# Patient Record
Sex: MALE | Race: WHITE | Employment: FULL TIME | URBAN - NONMETROPOLITAN AREA
[De-identification: names, ages, dates, MRNs, and addresses within clinical notes are randomized per-mention and may not be internally consistent; named-entity substitution may affect disease eponyms.]

---

## 2019-12-26 ENCOUNTER — OFFICE VISIT (OUTPATIENT)
Dept: PRIMARY CARE CLINIC | Age: 59
End: 2019-12-26
Payer: COMMERCIAL

## 2019-12-26 ENCOUNTER — HOSPITAL ENCOUNTER (OUTPATIENT)
Dept: GENERAL RADIOLOGY | Age: 59
Discharge: HOME | End: 2019-12-28
Payer: COMMERCIAL

## 2019-12-26 ENCOUNTER — TELEPHONE (OUTPATIENT)
Dept: FAMILY MEDICINE CLINIC | Age: 59
End: 2019-12-26

## 2019-12-26 VITALS
HEART RATE: 96 BPM | OXYGEN SATURATION: 98 % | BODY MASS INDEX: 37.97 KG/M2 | WEIGHT: 280 LBS | DIASTOLIC BLOOD PRESSURE: 94 MMHG | RESPIRATION RATE: 20 BRPM | SYSTOLIC BLOOD PRESSURE: 158 MMHG

## 2019-12-26 DIAGNOSIS — M54.2 NECK PAIN ON LEFT SIDE: ICD-10-CM

## 2019-12-26 PROCEDURE — 99214 OFFICE O/P EST MOD 30 MIN: CPT | Performed by: PHYSICIAN ASSISTANT

## 2019-12-26 PROCEDURE — 72050 X-RAY EXAM NECK SPINE 4/5VWS: CPT

## 2019-12-26 RX ORDER — CYCLOBENZAPRINE HCL 10 MG
10 TABLET ORAL 3 TIMES DAILY PRN
Qty: 21 TABLET | Refills: 0 | Status: SHIPPED | OUTPATIENT
Start: 2019-12-26 | End: 2020-01-05

## 2019-12-26 RX ORDER — LORATADINE 10 MG/1
10 TABLET ORAL DAILY PRN
COMMUNITY

## 2019-12-26 RX ORDER — PREDNISONE 20 MG/1
TABLET ORAL
Qty: 15 TABLET | Refills: 0 | Status: SHIPPED | OUTPATIENT
Start: 2019-12-26 | End: 2020-02-05 | Stop reason: ALTCHOICE

## 2019-12-26 ASSESSMENT — ENCOUNTER SYMPTOMS
VOMITING: 0
COUGH: 0
BACK PAIN: 1
NAUSEA: 0
DIARRHEA: 0
SHORTNESS OF BREATH: 0
RESPIRATORY NEGATIVE: 1

## 2019-12-26 ASSESSMENT — PATIENT HEALTH QUESTIONNAIRE - PHQ9
SUM OF ALL RESPONSES TO PHQ QUESTIONS 1-9: 0
1. LITTLE INTEREST OR PLEASURE IN DOING THINGS: 0
SUM OF ALL RESPONSES TO PHQ QUESTIONS 1-9: 0
2. FEELING DOWN, DEPRESSED OR HOPELESS: 0
SUM OF ALL RESPONSES TO PHQ9 QUESTIONS 1 & 2: 0

## 2019-12-26 NOTE — PROGRESS NOTES
fatigue and fever. HENT:        Drainage from nose is yellow. Respiratory: Negative. Negative for cough and shortness of breath. Cardiovascular: Negative for chest pain and palpitations. Gastrointestinal: Negative for diarrhea, nausea and vomiting. Musculoskeletal: Positive for back pain, neck pain and neck stiffness. Negative for myalgias. Taking aleve, motrin tylenol norco nothing helped. Ice did help him sleep.  cbd oil did not help. Skin: Negative for rash. Has had shingles in past not in current affected areas. Neurological: Positive for numbness. Negative for dizziness, weakness, light-headedness and headaches. Has burning in the upper back and around the left ear. No numbness tingling into the left arm. Psychiatric/Behavioral: Positive for sleep disturbance. The patient is not nervous/anxious. The neck back shoulder keeps him awake. He slept on the left side and was ok can not sleep on the right side. Sleeping in recliner lately. His pain was severe last night. Objective:      BP (!) 158/94   Pulse 96   Resp 20   Wt 280 lb (127 kg)   SpO2 98%   BMI 37.97 kg/m²     Physical Exam  Vitals signs and nursing note reviewed. Constitutional:       Appearance: Normal appearance. HENT:      Head: Normocephalic and atraumatic. Right Ear: External ear normal.      Left Ear: External ear normal.      Nose: Nose normal. No rhinorrhea. Eyes:      General: No scleral icterus. Neck:      Musculoskeletal: Normal range of motion. Neck rigidity and muscular tenderness present. Comments: Pain with flexion of the cervical spine. Pain with rotation as well. Has very tight trapezii bilaterally. No pain with palpation over the cervical spine except at C7. No redness or swelling noted. Cardiovascular:      Rate and Rhythm: Normal rate and regular rhythm. Heart sounds: Normal heart sounds. No murmur. No gallop.     Pulmonary:      Effort: Pulmonary effort is normal. No respiratory distress. Breath sounds: Normal breath sounds. No stridor. No wheezing, rhonchi or rales. Chest:      Chest wall: No tenderness. Musculoskeletal: Normal range of motion. General: Tenderness present. Lymphadenopathy:      Cervical: No cervical adenopathy. Skin:     General: Skin is warm and dry. Findings: No erythema or rash. Neurological:      General: No focal deficit present. Mental Status: He is alert and oriented to person, place, and time. Sensory: No sensory deficit. Motor: No weakness. Coordination: Coordination normal.      Deep Tendon Reflexes: Reflexes normal.      Comments: Normal strength and sensation upper extremities bilaterally. Normal range of motion of the upper extremities. Psychiatric:         Mood and Affect: Mood normal.         Behavior: Behavior normal.       Xr Cervical Spine (4-5 Views)    Result Date: 12/26/2019  EXAMINATION: 6 XRAY VIEWS OF THE CERVICAL SPINE 12/26/2019 10:36 am COMPARISON: None. HISTORY: ORDERING SYSTEM PROVIDED HISTORY: Neck pain on left side TECHNOLOGIST PROVIDED HISTORY: neck pain especially at C 7 doing some heavy lifting lately no trauma otherwise  history of herniated cervical disc years ago no surgery  numbness burning in the left trap and around the left ear Reason for Exam: Neck pain especially at C7 doing some heavy lifting lately. No trauma. History of herniated cervical disc year ago, no surgery. Numbness and burning in left trap. And around the left ear. Attempted to have patient use sandbags for views, pain in left shoulder. Acuity: Chronic Type of Exam: Initial FINDINGS: Straightening of the normal cervical lordosis which may relate to muscle spasm. No acute fracture or subluxation is noted. Vertebral body heights appear well maintained. There appears to be moderate spondylosis at the level of C4-5 with mild to moderate bilateral neural foraminal stenosis.

## 2020-02-05 ENCOUNTER — OFFICE VISIT (OUTPATIENT)
Dept: FAMILY MEDICINE CLINIC | Age: 60
End: 2020-02-05
Payer: COMMERCIAL

## 2020-02-05 ENCOUNTER — TELEPHONE (OUTPATIENT)
Dept: SURGERY | Age: 60
End: 2020-02-05

## 2020-02-05 VITALS
WEIGHT: 277 LBS | HEART RATE: 74 BPM | SYSTOLIC BLOOD PRESSURE: 136 MMHG | HEIGHT: 72 IN | DIASTOLIC BLOOD PRESSURE: 74 MMHG | OXYGEN SATURATION: 97 % | BODY MASS INDEX: 37.52 KG/M2 | RESPIRATION RATE: 18 BRPM

## 2020-02-05 PROCEDURE — 99396 PREV VISIT EST AGE 40-64: CPT | Performed by: PHYSICIAN ASSISTANT

## 2020-02-05 ASSESSMENT — PATIENT HEALTH QUESTIONNAIRE - PHQ9
SUM OF ALL RESPONSES TO PHQ QUESTIONS 1-9: 0
1. LITTLE INTEREST OR PLEASURE IN DOING THINGS: 0
SUM OF ALL RESPONSES TO PHQ9 QUESTIONS 1 & 2: 0
2. FEELING DOWN, DEPRESSED OR HOPELESS: 0
SUM OF ALL RESPONSES TO PHQ QUESTIONS 1-9: 0

## 2020-02-05 ASSESSMENT — ENCOUNTER SYMPTOMS
RESPIRATORY NEGATIVE: 1
NAUSEA: 0
DIARRHEA: 0
VOMITING: 0
COUGH: 0
SHORTNESS OF BREATH: 0

## 2020-02-05 NOTE — PROGRESS NOTES
[facility]    Subjective:      Patient ID: Gracie Kumar is a 61 y.o. y.o. male. Patient is seen today to establish no recent illness and does not want a flu shot. I saw him in urgent care for his left shoulder and is much better and can even sleep on this side with no problems. 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 Has worked there 43 years. Past Medical History:   Diagnosis Date    Anemia     Avascular necrosis ([address]     bilateral hips. S/P FAVIAN b/L    Kidney stone     Mixed hyperlipidemia     Obesity     Osteoarthritis     hips, knees       Past Surgical History:   Procedure Laterality Date    OTHER SURGICAL HISTORY Left 08/10/2016    cortisone injection-knee. [facility]    REVISION TOTAL HIP ARTHROPLASTY Left 11/2013    Dr. Oli Duran Right 12/16/2009    TOTAL HIP ARTHROPLASTY Left 10/28/2009       Family History   Problem Relation Age of Onset    Stroke Father 76    Cancer Father         skin    Other Brother         sleep apnea    COPD Brother         tobacco abuse       Allergies   Allergen Reactions    Pcn [Penicillins] Other (See Comments)     Unknown rx       Current Outpatient Medications   Medication Sig Dispense Refill    loratadine (CLARITIN) 10 MG tablet Take 10 mg by mouth daily as needed      naproxen sodium (ALEVE) 220 MG tablet Take 220 mg by mouth 2 times daily as needed for Pain.  Multiple Vitamins-Minerals (THERAPEUTIC MULTIVITAMIN-MINERALS) tablet Take 1 tablet by mouth daily.  aspirin 81 MG tablet Take 81 mg by mouth daily.  ferrous sulfate 325 (65 FE) MG tablet Take 325 mg by mouth every other day. No current facility-administered medications for this visit. Review of Systems   Constitutional: Negative for appetite change, chills, fatigue and fever. Works 3 rd shift so energy is appropriate given this.   It really depends on sleep quality. HENT: Negative. Eyes:        Wears glasses KIKE darien Giles sees Gonzalo Pickard. Respiratory: Negative. Negative for cough and shortness of breath. Cardiovascular: Negative for chest pain, palpitations and leg swelling. Gastrointestinal: Negative for diarrhea, nausea and vomiting. Has not had colonoscopy. Musculoskeletal: Negative for myalgias. Skin: Negative for rash. Neurological: Negative for light-headedness, numbness and headaches. Psychiatric/Behavioral: Negative for sleep disturbance. The patient is not nervous/anxious. Objective:      /74   Pulse 74   Resp 18   Ht 6' (1.829 m)   Wt 277 lb (125.6 kg)   SpO2 97%   BMI 37.57 kg/m²     Physical Exam  Vitals signs and nursing note reviewed. Constitutional:       General: He is not in acute distress. Appearance: Normal appearance. He is well-developed. He is obese. HENT:      Head: Normocephalic and atraumatic. Right Ear: Tympanic membrane and ear canal normal.      Left Ear: Tympanic membrane and ear canal normal.      Nose: Nose normal. No congestion or rhinorrhea. Mouth/Throat:      Mouth: Mucous membranes are moist.      Pharynx: No oropharyngeal exudate or posterior oropharyngeal erythema. Eyes:      General: No scleral icterus. Conjunctiva/sclera: Conjunctivae normal.   Neck:      Musculoskeletal: Normal range of motion and neck supple. No neck rigidity or muscular tenderness. Thyroid: No thyroid mass, thyromegaly or thyroid tenderness. Vascular: No carotid bruit. Cardiovascular:      Rate and Rhythm: Normal rate and regular rhythm. Heart sounds: Normal heart sounds. No murmur. No gallop. Pulmonary:      Effort: Pulmonary effort is normal. No respiratory distress. Breath sounds: Normal breath sounds. No stridor. No wheezing, rhonchi or rales. Abdominal:      General: Bowel sounds are normal. There is no distension. Palpations: Abdomen is soft. There is no mass. Tenderness: There is no abdominal tenderness. There is no guarding or rebound. Hernia: No hernia is present. Musculoskeletal:         General: No swelling. Lymphadenopathy:      Cervical: No cervical adenopathy. Skin:     General: Skin is warm and dry. Findings: No rash. Neurological:      General: No focal deficit present. Mental Status: He is alert and oriented to person, place, and time. Psychiatric:         Mood and Affect: Mood normal.         Behavior: Behavior normal.         Thought Content: Thought content normal.         Judgment: Judgment normal.           Assessment & Plan:     1. Need for vaccination    - INFLUENZA, QUADV, 3 YRS AND OLDER, IM PF, PREFILL SYR OR SDV, 0.5ML (AFLURIA QUADV, PF)  - Ambulatory referral to General Surgery  - zoster recombinant adjuvanted vaccine [facility]) 50 MCG/0.5ML SUSR injection; Inject 0.5 mLs into the muscle See Admin Instructions 1 dose now and repeat in 2-6 months  Dispense: 0.5 mL; Refill: 0    2. Screening for colon cancer    - Ambulatory referral to General Surgery    3. Impaired glucose tolerance test    - Hemoglobin A1C; Future    4. Mixed hyperlipidemia    - Lipid Panel;  Future    He will be notified of all results  Further treatment based on results  Follow up six months sooner if problems  Lifestyle changes  Flu shot today  Monitor shoulder if changes or worsen let me know  HEP  Answered his questions          KEVIN Nunez  2/5/2020 1:19 PM    (Pleasenote that portions of this note were completed with a voice recognition program.Efforts were made to edit the dictations but occasionally words are mis-transcribed.)

## 2020-02-05 NOTE — TELEPHONE ENCOUNTER
Sent out Dr. Lake Gave paperwork for colonoscopy at this time. No s/sx at this time. Ref by [facility].

## 2021-01-14 ENCOUNTER — TELEPHONE (OUTPATIENT)
Dept: FAMILY MEDICINE CLINIC | Age: 61
End: 2021-01-14

## 2021-01-17 ENCOUNTER — OFFICE VISIT (OUTPATIENT)
Dept: PRIMARY CARE CLINIC | Age: 61
End: 2021-01-17
Payer: COMMERCIAL

## 2021-01-17 ENCOUNTER — HOSPITAL ENCOUNTER (OUTPATIENT)
Age: 61
Setting detail: SPECIMEN
Discharge: HOME | End: 2021-01-17
Payer: COMMERCIAL

## 2021-01-17 VITALS
HEART RATE: 71 BPM | HEIGHT: 72 IN | BODY MASS INDEX: 37.76 KG/M2 | DIASTOLIC BLOOD PRESSURE: 100 MMHG | OXYGEN SATURATION: 98 % | WEIGHT: 278.8 LBS | SYSTOLIC BLOOD PRESSURE: 140 MMHG | RESPIRATION RATE: 18 BRPM | TEMPERATURE: 99.5 F

## 2021-01-17 DIAGNOSIS — R09.81 CONGESTION OF NASAL SINUS: ICD-10-CM

## 2021-01-17 DIAGNOSIS — R53.83 FATIGUE, UNSPECIFIED TYPE: ICD-10-CM

## 2021-01-17 DIAGNOSIS — R05.9 COUGH: ICD-10-CM

## 2021-01-17 DIAGNOSIS — Z20.822 PERSON UNDER INVESTIGATION FOR COVID-19: ICD-10-CM

## 2021-01-17 DIAGNOSIS — R05.9 COUGH: Primary | ICD-10-CM

## 2021-01-17 PROCEDURE — U0003 INFECTIOUS AGENT DETECTION BY NUCLEIC ACID (DNA OR RNA); SEVERE ACUTE RESPIRATORY SYNDROME CORONAVIRUS 2 (SARS-COV-2) (CORONAVIRUS DISEASE [COVID-19]), AMPLIFIED PROBE TECHNIQUE, MAKING USE OF HIGH THROUGHPUT TECHNOLOGIES AS DESCRIBED BY CMS-2020-01-R: HCPCS

## 2021-01-17 PROCEDURE — 99213 OFFICE O/P EST LOW 20 MIN: CPT | Performed by: NURSE PRACTITIONER

## 2021-01-17 ASSESSMENT — PATIENT HEALTH QUESTIONNAIRE - PHQ9
SUM OF ALL RESPONSES TO PHQ QUESTIONS 1-9: 0
SUM OF ALL RESPONSES TO PHQ QUESTIONS 1-9: 0
SUM OF ALL RESPONSES TO PHQ9 QUESTIONS 1 & 2: 0

## 2021-01-17 ASSESSMENT — ENCOUNTER SYMPTOMS
SORE THROAT: 1
WHEEZING: 0
COUGH: 1
NAUSEA: 0
VOMITING: 0
SHORTNESS OF BREATH: 0
DIARRHEA: 1

## 2021-01-17 NOTE — PROGRESS NOTES
[facility]  [facility] [place]  [place] [address]  Dept: [phone]  Dept Fax: [phone]  Loc: [phone]        CHIEF COMPLAINT       Chief Complaint   Patient presents with    Cough     stuffy nose,diarrhea,fatigue,sore throat ,exposed to positive ,started thursday       Nurses Notes reviewed and I agree except as noted in the HPI. HISTORY OF PRESENT ILLNESS   Alber Thomas is a 61 y.o. male who presents to [facility] today (1/17/2021) for evaluation of:   Cough  This is a new problem. The current episode started in the past 7 days (Thursday). The cough is non-productive. Associated symptoms include a fever (99.5 at home), nasal congestion and a sore throat. Pertinent negatives include no chest pain, shortness of breath or wheezing. He has tried OTC cough suppressant (nasal spray) for the symptoms. The treatment provided mild relief. Wife is covid positive currently. REVIEW OF SYSTEMS     Review of Systems   Constitutional: Positive for fatigue and fever (99.5 at home). HENT: Positive for congestion and sore throat. Respiratory: Positive for cough. Negative for shortness of breath and wheezing. Cardiovascular: Negative for chest pain. Gastrointestinal: Positive for diarrhea (loose stools). Negative for nausea and vomiting. PAST MEDICAL HISTORY         Diagnosis Date    Anemia     Avascular necrosis ([address]     bilateral hips. S/P FAVIAN b/L    Kidney stone     Mixed hyperlipidemia     Obesity     Osteoarthritis     hips, knees       SURGICAL HISTORY     Patient  has a past surgical history that includes Total hip arthroplasty (Right, 12/16/2009); Total hip arthroplasty (Left, 10/28/2009); Revision total hip arthroplasty (Left, 11/2013); other surgical history (Left, 08/10/2016); and [place] tooth extraction.     CURRENT MEDICATIONS       Outpatient Medications Prior to Visit Medication Sig Dispense Refill    naproxen sodium (ALEVE) 220 MG tablet Take 220 mg by mouth 2 times daily as needed for Pain.  Multiple Vitamins-Minerals (THERAPEUTIC MULTIVITAMIN-MINERALS) tablet Take 1 tablet by mouth daily.  aspirin 81 MG tablet Take 81 mg by mouth daily.  ferrous sulfate 325 (65 FE) MG tablet Take 325 mg by mouth every other day.  loratadine (CLARITIN) 10 MG tablet Take 10 mg by mouth daily as needed       No facility-administered medications prior to visit. ALLERGIES     Patient is is allergic to pcn [penicillins]. FAMILY HISTORY     Patient's family history includes COPD in his brother; Cancer in his father; Other in his brother; Stroke (age of onset: 76) in his father. SOCIAL HISTORY     Patient  reports that he has never smoked. He has never used smokeless tobacco. He reports current alcohol use of about 5.0 standard drinks of alcohol per week. He reports that he does not use drugs. PHYSICAL EXAM     VITALS  BP: (!) 140/100, Temp: 99.5 °F (37.5 °C), Pulse: 71, Resp: 18, SpO2: 98 %  Physical Exam  Vitals signs reviewed. Constitutional:       General: He is not in acute distress. Appearance: He is not ill-appearing. HENT:      Nose: Congestion present. No rhinorrhea. Eyes:      Pupils: Pupils are equal, round, and reactive to light. Neck:      Musculoskeletal: Normal range of motion and neck supple. No neck rigidity. Cardiovascular:      Rate and Rhythm: Normal rate and regular rhythm. Heart sounds: Normal heart sounds, S1 normal and S2 normal. No murmur. Pulmonary:      Effort: Pulmonary effort is normal. No accessory muscle usage or respiratory distress. Breath sounds: Normal breath sounds. No wheezing or rhonchi. Musculoskeletal: Normal range of motion. Lymphadenopathy:      Cervical: No cervical adenopathy. Skin:     General: Skin is warm and dry. Capillary Refill: Capillary refill takes less than 2 seconds. Neurological:      General: No focal deficit present. Mental Status: He is alert. DIAGNOSTIC RESULTS   Labs:No results found for this visit on 01/17/21. IMAGING:        CLINICAL COURSE:     Vitals:    01/17/21 1532   BP: (!) 140/100   Pulse: 71   Resp: 18   Temp: 99.5 °F (37.5 °C)   TempSrc: Temporal   SpO2: 98%   Weight: 278 lb 12.8 oz (126.5 kg)   Height: 6' (1.829 m)           PROCEDURES:  None  FINAL IMPRESSION      1. Cough    2. Congestion of nasal sinus    3. Fatigue, unspecified type    4. Person under investigation for COVID-19         DISPOSITION/PLAN     Patient Instructions     Will notify you of covid test result as soon as available. You should isolate at home in an area away from family. If you must be around family members, please wear a mask. Quarantine at home until result is available. This means do not go to work/school, attend family gatherings, or invite others to your home until you know your test results. A work/school note will be provided for you. Symptoms appear viral; no antibiotic warranted at this time. May try mucinex (guaifenesin) to help with congestion and robitussin for persistent cough. May use tylenol or ibuprofen for fever/body aches/headache. Increase water intake to help thin secretions. Use cool mist humidifier at bedtime. Use nasal saline flush as needed. Practice good hand hygiene and cover your cough and sneeze to prevent passing virus on. If symptoms worsen or fail to improve, please return to clinic. If you develop severe worsening of symptoms such as chest pain or difficulty breathing, please go to ER. Patient Education        Learning About Coronavirus [phone])  What is coronavirus (COVID-19)? COVID-19 is a disease caused by a new type of coronavirus. This illness was first found in December 2019. It has since spread worldwide. Coronaviruses are a large group of viruses. They cause the common cold. They also cause more serious illnesses like Middle East respiratory syndrome (MERS) and severe acute respiratory syndrome (SARS). COVID-19 is caused by a novel coronavirus. That means it's a new type that has not been seen in people before. What are the symptoms? Coronavirus (COVID-19) symptoms may include:  · Fever. · Cough. · Trouble breathing. · Chills or repeated shaking with chills. · Muscle pain. · Headache. · Sore throat. · New loss of taste or smell. · Vomiting. · Diarrhea. In severe cases, COVID-19 can cause pneumonia and make it hard to breathe without help from a machine. It can cause death. How is it diagnosed? COVID-19 is diagnosed with a viral test. This may also be called a PCR test or antigen test. It looks for evidence of the virus in your breathing passages or lungs (respiratory system). The test is most often done on a sample from the nose, throat, or lungs. It's sometimes done on a sample of saliva. One way a sample is collected is by putting a long swab into the back of your nose. How is it treated? Mild cases of COVID-19 can be treated at home. Serious cases need treatment in the hospital. Treatment may include medicines to reduce symptoms, plus breathing support such as oxygen therapy or a ventilator. Some people may be placed on their belly to help their oxygen levels. Treatments that may help people who have COVID-19 include:  Antiviral medicines. These medicines treat viral infections. Remdesivir is an example. Immune-based therapy. These medicines help the immune system fight COVID-19. One example is bamlanivimab. It's a monoclonal antibody. Blood thinners. These medicines help prevent blood clots. People with severe illness are at risk for blood clots. How can you protect yourself and others?   The best way to protect yourself from getting sick is to: Follow-up care is a key part of your treatment and safety. Be sure to make and go to all appointments, and call your doctor if you are having problems. It's also a good idea to know your test results and keep a list of the medicines you take. How can you care for yourself at home? · Get extra rest. It can help you feel better. · Drink plenty of fluids. This helps replace fluids lost from fever. Fluids also help ease a scratchy throat. Water, soup, fruit juice, and hot tea with lemon are good choices. · Take acetaminophen (such as Tylenol) to reduce a fever. It may also help with muscle aches. Read and follow all instructions on the label. · Use petroleum jelly on sore skin. This can help if the skin around your nose and lips becomes sore from rubbing a lot with tissues. Tips for self-isolation  · Limit contact with people in your home. If possible, stay in a separate bedroom and use a separate bathroom. · Wear a cloth face cover when you are around other people. It can help stop the spread of the virus when you cough or sneeze. · If you have to leave home, avoid crowds and try to stay at least 6 feet away from other people. · Avoid contact with pets and other animals. · Cover your mouth and nose with a tissue when you cough or sneeze. Then throw it in the trash right away. · Wash your hands often, especially after you cough or sneeze. Use soap and water, and scrub for at least 20 seconds. If soap and water aren't available, use an alcohol-based hand . · Don't share personal household items. These include bedding, towels, cups and glasses, and eating utensils. · [address] in the warmest water allowed for the fabric type, and dry it completely. It's okay to wash other people's laundry with yours. · Clean and disinfect your home every day. Use household  and disinfectant wipes or sprays. Take special care to clean things that you grab with your hands. These include doorknobs, remote controls, phones, and handles on your refrigerator and microwave. And don't forget countertops, tabletops, bathrooms, and computer keyboards. When you can end self-isolation  · If you know or suspect that you have COVID-19, stay in self-isolation until:  ? You haven't had a fever for 24 hours while not taking medicines to lower the fever, and  ? Your symptoms have improved, and  ? It's been at least 10 days since your symptoms started. · Talk to your doctor about whether you also need testing, especially if you have a weakened immune system. When should you call for help? Call 911 anytime you think you may need emergency care. For example, call if you have life-threatening symptoms, such as:    · You have severe trouble breathing. (You can't talk at all.)     · You have constant chest pain or pressure.     · You are severely dizzy or lightheaded.     · You are confused or can't think clearly.     · Your face and lips have a blue color.     · You pass out (lose consciousness) or are very hard to wake up. Call your doctor now or seek immediate medical care if:    · You have moderate trouble breathing. (You can't speak a full sentence.)     · You are coughing up blood (more than about 1 teaspoon).     · You have signs of low blood pressure. These include feeling lightheaded; being too weak to stand; and having cold, pale, clammy skin. Watch closely for changes in your health, and be sure to contact your doctor if:    · Your symptoms get worse.     · You are not getting better as expected. Call before you go to the doctor's office. Follow their instructions. And wear a cloth face cover. Current as of: December 18, 2020               Content Version: 12.7  © 1667-0701 [employer], [employer]. Care instructions adapted under license by [facility] ([facility]). If you have questions about a medical condition or this instruction, always ask your healthcare professional. [address] any warranty or liability for your use of this information. Orders Placed This Encounter   Procedures    COVID-19 Ambulatory     Standing Status:   Future     Standing Expiration Date:   2/17/2021     Scheduling Instructions:      Saline media preferred given current shortage of viral transport media but both acceptable     Order Specific Question:   Is this test for diagnosis or screening? Answer:   Diagnosis of ill patient     Order Specific Question:   Symptomatic for COVID-19 as defined by CDC? Answer:   Yes     Order Specific Question:   Date of Symptom Onset     Answer:   1/14/2021     Order Specific Question:   Hospitalized for COVID-19? Answer:   No     Order Specific Question:   Admitted to ICU for COVID-19? Answer:   No     Order Specific Question:   Employed in healthcare setting? Answer:   No     Order Specific Question:   Resident in a congregate (group) care setting? Answer:   No     Order Specific Question:   Pregnant: Answer:   No     Order Specific Question:   Previously tested for COVID-19? Answer:   No     Outpatient Encounter Medications as of 1/17/2021   Medication Sig Dispense Refill    naproxen sodium (ALEVE) 220 MG tablet Take 220 mg by mouth 2 times daily as needed for Pain.  Multiple Vitamins-Minerals (THERAPEUTIC MULTIVITAMIN-MINERALS) tablet Take 1 tablet by mouth daily.  aspirin 81 MG tablet Take 81 mg by mouth daily.  ferrous sulfate 325 (65 FE) MG tablet Take 325 mg by mouth every other day.  loratadine (CLARITIN) 10 MG tablet Take 10 mg by mouth daily as needed       No facility-administered encounter medications on file as of 1/17/2021. No follow-ups on file. Electronically signed by CHANDAN Yang NP on 1/17/2021 at 4:07 PM

## 2021-01-17 NOTE — PATIENT INSTRUCTIONS
Will notify you of covid test result as soon as available. You should isolate at home in an area away from family. If you must be around family members, please wear a mask. Quarantine at home until result is available. This means do not go to work/school, attend family gatherings, or invite others to your home until you know your test results. A work/school note will be provided for you. Symptoms appear viral; no antibiotic warranted at this time. May try mucinex (guaifenesin) to help with congestion and robitussin for persistent cough. May use tylenol or ibuprofen for fever/body aches/headache. Increase water intake to help thin secretions. Use cool mist humidifier at bedtime. Use nasal saline flush as needed. Practice good hand hygiene and cover your cough and sneeze to prevent passing virus on. If symptoms worsen or fail to improve, please return to clinic. If you develop severe worsening of symptoms such as chest pain or difficulty breathing, please go to ER. Patient Education        Learning About Coronavirus [phone]  What is coronavirus (COVID-19)? COVID-19 is a disease caused by a new type of coronavirus. This illness was first found in December 2019. It has since spread worldwide. Coronaviruses are a large group of viruses. They cause the common cold. They also cause more serious illnesses like Middle East respiratory syndrome (MERS) and severe acute respiratory syndrome (SARS). COVID-19 is caused by a novel coronavirus. That means it's a new type that has not been seen in people before. What are the symptoms? Coronavirus (COVID-19) symptoms may include:  · Fever. · Cough. · Trouble breathing. · Chills or repeated shaking with chills. · Muscle pain. · Headache. · Sore throat. · New loss of taste or smell. · Vomiting. · Diarrhea. In severe cases, COVID-19 can cause pneumonia and make it hard to breathe without help from a machine. It can cause death. How is it diagnosed? COVID-19 is diagnosed with a viral test. This may also be called a PCR test or antigen test. It looks for evidence of the virus in your breathing passages or lungs (respiratory system). The test is most often done on a sample from the nose, throat, or lungs. It's sometimes done on a sample of saliva. One way a sample is collected is by putting a long swab into the back of your nose. How is it treated? Mild cases of COVID-19 can be treated at home. Serious cases need treatment in the hospital. Treatment may include medicines to reduce symptoms, plus breathing support such as oxygen therapy or a ventilator. Some people may be placed on their belly to help their oxygen levels. Treatments that may help people who have COVID-19 include:  Antiviral medicines. These medicines treat viral infections. Remdesivir is an example. Immune-based therapy. These medicines help the immune system fight COVID-19. One example is bamlanivimab. It's a monoclonal antibody. Blood thinners. These medicines help prevent blood clots. People with severe illness are at risk for blood clots. How can you protect yourself and others? The best way to protect yourself from getting sick is to:  · Avoid areas where there is an outbreak. · Avoid contact with people who may be infected. · Avoid crowds and try to stay at least 6 feet away from other people. · Wash your hands often, especially after you cough or sneeze. Use soap and water, and scrub for at least 20 seconds. If soap and water aren't available, use an alcohol-based hand . · Avoid touching your mouth, nose, and eyes. To help avoid spreading the virus to others:  · Stay home if you are sick or have been exposed to the virus. Don't go to school, work, or public areas. And don't use public transportation, ride-shares, or taxis unless you have no choice. · Wear a cloth face cover if you have to go to public areas. · Cover your mouth with a tissue when you cough or sneeze. Then throw the tissue in the trash and wash your hands right away. · If you're sick:  ? Leave your home only if you need to get medical care. But call the doctor's office first so they know you're coming. And wear a face cover. ? Wear the face cover whenever you're around other people. It can help stop the spread of the virus when you cough or sneeze. ? Limit contact with pets and people in your home. If possible, stay in a separate bedroom and use a separate bathroom. ? Clean and disinfect your home every day. Use household  and disinfectant wipes or sprays. Take special care to clean things that you grab with your hands. These include doorknobs, remote controls, phones, and handles on your refrigerator and microwave. And don't forget countertops, tabletops, bathrooms, and computer keyboards. When should you call for help? Call 911 anytime you think you may need emergency care. For example, call if you have life-threatening symptoms, such as:    · You have severe trouble breathing. (You can't talk at all.)     · You have constant chest pain or pressure.     · You are severely dizzy or lightheaded.     · You are confused or can't think clearly.     · Your face and lips have a blue color.     · You pass out (lose consciousness) or are very hard to wake up. Call your doctor now or seek immediate medical care if:    · You have moderate trouble breathing. (You can't speak a full sentence.)     · You are coughing up blood (more than about 1 teaspoon).     · You have signs of low blood pressure. These include feeling lightheaded; being too weak to stand; and having cold, pale, clammy skin. Watch closely for changes in your health, and be sure to contact your doctor if:    · Your symptoms get worse.     · You are not getting better as expected. Call before you go to the doctor's office. Follow their instructions. And wear a cloth face cover. Current as of: December 18, 2020               Content Version: 12.7  © 9295-2655 [employer]. Care instructions adapted under license by [facility] ([facility]). If you have questions about a medical condition or this instruction, always ask your healthcare professional. [address] any warranty or liability for your use of this information. Patient Education        Coronavirus (MEH-52): Care Instructions  Overview  The coronavirus disease (COVID-19) is caused by a virus. Symptoms may include a fever, a cough, and shortness of breath. It mainly spreads person-to-person through droplets from coughing and sneezing. The virus also can spread when people are in close contact with someone who is infected. Most people have mild symptoms and can take care of themselves at home. If their symptoms get worse, they may need care in a hospital. Treatment may include medicines to reduce symptoms, plus breathing support such as oxygen therapy or a ventilator. It's important to not spread the virus to others. If you have COVID-19, wear a face cover anytime you are around other people. You need to isolate yourself while you are sick. Leave your home only if you need to get medical care or testing. Follow-up care is a key part of your treatment and safety. Be sure to make and go to all appointments, and call your doctor if you are having problems. It's also a good idea to know your test results and keep a list of the medicines you take. How can you care for yourself at home? · Get extra rest. It can help you feel better. · Drink plenty of fluids. This helps replace fluids lost from fever. Fluids also help ease a scratchy throat. Water, soup, fruit juice, and hot tea with lemon are good choices. · Take acetaminophen (such as Tylenol) to reduce a fever. It may also help with muscle aches. Read and follow all instructions on the label. · Use petroleum jelly on sore skin. This can help if the skin around your nose and lips becomes sore from rubbing a lot with tissues. Tips for self-isolation  · Limit contact with people in your home. If possible, stay in a separate bedroom and use a separate bathroom. · Wear a cloth face cover when you are around other people. It can help stop the spread of the virus when you cough or sneeze. · If you have to leave home, avoid crowds and try to stay at least 6 feet away from other people. · Avoid contact with pets and other animals. · Cover your mouth and nose with a tissue when you cough or sneeze. Then throw it in the trash right away. · Wash your hands often, especially after you cough or sneeze. Use soap and water, and scrub for at least 20 seconds. If soap and water aren't available, use an alcohol-based hand . · Don't share personal household items. These include bedding, towels, cups and glasses, and eating utensils. · [address] in the warmest water allowed for the fabric type, and dry it completely. It's okay to wash other people's laundry with yours. · Clean and disinfect your home every day. Use household  and disinfectant wipes or sprays. Take special care to clean things that you grab with your hands. These include doorknobs, remote controls, phones, and handles on your refrigerator and microwave. And don't forget countertops, tabletops, bathrooms, and computer keyboards. When you can end self-isolation  · If you know or suspect that you have COVID-19, stay in self-isolation until:  ? You haven't had a fever for 24 hours while not taking medicines to lower the fever, and  ? Your symptoms have improved, and  ? It's been at least 10 days since your symptoms started. · Talk to your doctor about whether you also need testing, especially if you have a weakened immune system. When should you call for help? Call 911 anytime you think you may need emergency care. For example, call if you have life-threatening symptoms, such as:    · You have severe trouble breathing. (You can't talk at all.)     · You have constant chest pain or pressure.     · You are severely dizzy or lightheaded.     · You are confused or can't think clearly.     · Your face and lips have a blue color.     · You pass out (lose consciousness) or are very hard to wake up. Call your doctor now or seek immediate medical care if:    · You have moderate trouble breathing. (You can't speak a full sentence.)     · You are coughing up blood (more than about 1 teaspoon).     · You have signs of low blood pressure. These include feeling lightheaded; being too weak to stand; and having cold, pale, clammy skin. Watch closely for changes in your health, and be sure to contact your doctor if:    · Your symptoms get worse.     · You are not getting better as expected. Call before you go to the doctor's office. Follow their instructions. And wear a cloth face cover. Current as of: December 18, 2020               Content Version: 12.7  © 2006-2021 [employer]. Care instructions adapted under license by [facility] ([facility]). If you have questions about a medical condition or this instruction, always ask your healthcare professional. [place] [address] any warranty or liability for your use of this information.

## 2021-01-20 LAB — SARS-COV-2, NAA: DETECTED

## 2021-01-25 ENCOUNTER — TELEPHONE (OUTPATIENT)
Dept: FAMILY MEDICINE CLINIC | Age: 61
End: 2021-01-25

## 2021-01-25 NOTE — TELEPHONE ENCOUNTER
The antibody therapy has strict requirements that have to be met in order to qualify. Unfortunately, pt does not qualify as onset of symptoms has been over 10 days (reported at time of visit as 1/14/21). Pt should continue to hydrate and treat symptoms at home. If pt develops increased SOB, chest pain, difficulty breathing, or high fevers, pt should return to clinic or go to ER.

## 2022-11-04 ENCOUNTER — HOSPITAL ENCOUNTER (EMERGENCY)
Age: 62
Discharge: HOME | End: 2022-11-04
Attending: EMERGENCY MEDICINE
Payer: COMMERCIAL

## 2022-11-04 ENCOUNTER — APPOINTMENT (OUTPATIENT)
Dept: CT IMAGING | Age: 62
End: 2022-11-04
Payer: COMMERCIAL

## 2022-11-04 VITALS
HEIGHT: 72 IN | DIASTOLIC BLOOD PRESSURE: 87 MMHG | SYSTOLIC BLOOD PRESSURE: 152 MMHG | WEIGHT: 280 LBS | HEART RATE: 74 BPM | RESPIRATION RATE: 18 BRPM | OXYGEN SATURATION: 92 % | BODY MASS INDEX: 37.93 KG/M2 | TEMPERATURE: 97.7 F

## 2022-11-04 DIAGNOSIS — N20.1 LEFT URETERAL STONE: Primary | ICD-10-CM

## 2022-11-04 LAB
ABSOLUTE EOS #: 0.14 K/UL (ref 0–0.44)
ABSOLUTE IMMATURE GRANULOCYTE: <0.03 K/UL (ref 0–0.3)
ABSOLUTE LYMPH #: 2.4 K/UL (ref 1.1–3.7)
ABSOLUTE MONO #: 0.89 K/UL (ref 0.1–1.2)
ALBUMIN SERPL-MCNC: 4.5 G/DL (ref 3.5–5.2)
ALBUMIN/GLOBULIN RATIO: 1.6 (ref 1–2.5)
ALP BLD-CCNC: 69 U/L (ref 40–129)
ALT SERPL-CCNC: 23 U/L (ref 5–41)
AMORPHOUS: ABNORMAL
ANION GAP SERPL CALCULATED.3IONS-SCNC: 16 MMOL/L (ref 9–17)
AST SERPL-CCNC: 23 U/L
BACTERIA: ABNORMAL
BASOPHILS # BLD: 0 % (ref 0–2)
BASOPHILS ABSOLUTE: 0.03 K/UL (ref 0–0.2)
BILIRUB SERPL-MCNC: 0.3 MG/DL (ref 0.3–1.2)
BILIRUBIN URINE: NEGATIVE
BUN BLDV-MCNC: 21 MG/DL (ref 8–23)
BUN/CREAT BLD: 19 (ref 9–20)
CALCIUM SERPL-MCNC: 10.2 MG/DL (ref 8.6–10.4)
CHLORIDE BLD-SCNC: 104 MMOL/L (ref 98–107)
CO2: 22 MMOL/L (ref 20–31)
CREAT SERPL-MCNC: 1.12 MG/DL (ref 0.7–1.2)
EOSINOPHILS RELATIVE PERCENT: 1 % (ref 1–4)
EPITHELIAL CELLS UA: ABNORMAL /HPF (ref 0–5)
GFR SERPL CREATININE-BSD FRML MDRD: >60 ML/MIN/1.73M2
GLUCOSE BLD-MCNC: 169 MG/DL (ref 70–99)
GLUCOSE URINE: NEGATIVE
HCT VFR BLD CALC: 38.4 % (ref 40.7–50.3)
HEMOGLOBIN: 12.6 G/DL (ref 13–17)
IMMATURE GRANULOCYTES: 0 %
KETONES, URINE: NEGATIVE
LEUKOCYTE ESTERASE, URINE: NEGATIVE
LIPASE: 28 U/L (ref 13–60)
LYMPHOCYTES # BLD: 22 % (ref 24–43)
MCH RBC QN AUTO: 30.2 PG (ref 25.2–33.5)
MCHC RBC AUTO-ENTMCNC: 32.8 G/DL (ref 25.2–33.5)
MCV RBC AUTO: 92.1 FL (ref 82.6–102.9)
MONOCYTES # BLD: 8 % (ref 3–12)
MUCUS: ABNORMAL
NITRITE, URINE: NEGATIVE
PDW BLD-RTO: 14.1 % (ref 11.8–14.4)
PH UA: 6 (ref 5–6)
PLATELET # BLD: 274 K/UL (ref 138–453)
PMV BLD AUTO: 10.3 FL (ref 8.1–13.5)
POTASSIUM SERPL-SCNC: 3.9 MMOL/L (ref 3.7–5.3)
PROTEIN UA: ABNORMAL
RBC # BLD: 4.17 M/UL (ref 4.21–5.77)
RBC UA: >50 /HPF (ref 0–4)
SEG NEUTROPHILS: 68 % (ref 36–65)
SEGMENTED NEUTROPHILS ABSOLUTE COUNT: 7.38 K/UL (ref 1.5–8.1)
SODIUM BLD-SCNC: 142 MMOL/L (ref 135–144)
SPECIFIC GRAVITY UA: 1.03 (ref 1.01–1.02)
TOTAL PROTEIN: 7.4 G/DL (ref 6.4–8.3)
URINE HGB: ABNORMAL
UROBILINOGEN, URINE: NORMAL
WBC # BLD: 10.9 K/UL (ref 3.5–11.3)
WBC UA: ABNORMAL /HPF (ref 0–4)

## 2022-11-04 PROCEDURE — 6370000000 HC RX 637 (ALT 250 FOR IP)

## 2022-11-04 PROCEDURE — 81001 URINALYSIS AUTO W/SCOPE: CPT

## 2022-11-04 PROCEDURE — 2580000003 HC RX 258: Performed by: EMERGENCY MEDICINE

## 2022-11-04 PROCEDURE — 96374 THER/PROPH/DIAG INJ IV PUSH: CPT

## 2022-11-04 PROCEDURE — 96361 HYDRATE IV INFUSION ADD-ON: CPT

## 2022-11-04 PROCEDURE — 85025 COMPLETE CBC W/AUTO DIFF WBC: CPT

## 2022-11-04 PROCEDURE — 83690 ASSAY OF LIPASE: CPT

## 2022-11-04 PROCEDURE — 74176 CT ABD & PELVIS W/O CONTRAST: CPT

## 2022-11-04 PROCEDURE — 99284 EMERGENCY DEPT VISIT MOD MDM: CPT

## 2022-11-04 PROCEDURE — 80053 COMPREHEN METABOLIC PANEL: CPT

## 2022-11-04 PROCEDURE — 6360000002 HC RX W HCPCS: Performed by: EMERGENCY MEDICINE

## 2022-11-04 PROCEDURE — 96375 TX/PRO/DX INJ NEW DRUG ADDON: CPT

## 2022-11-04 RX ORDER — TAMSULOSIN HYDROCHLORIDE 0.4 MG/1
CAPSULE ORAL
Status: COMPLETED
Start: 2022-11-04 | End: 2022-11-04

## 2022-11-04 RX ORDER — MORPHINE SULFATE 4 MG/ML
4 INJECTION, SOLUTION INTRAMUSCULAR; INTRAVENOUS ONCE
Status: COMPLETED | OUTPATIENT
Start: 2022-11-04 | End: 2022-11-04

## 2022-11-04 RX ORDER — TAMSULOSIN HYDROCHLORIDE 0.4 MG/1
0.4 CAPSULE ORAL DAILY
Status: DISCONTINUED | OUTPATIENT
Start: 2022-11-04 | End: 2022-11-04

## 2022-11-04 RX ORDER — TAMSULOSIN HYDROCHLORIDE 0.4 MG/1
0.4 CAPSULE ORAL DAILY
Status: DISCONTINUED | OUTPATIENT
Start: 2022-11-04 | End: 2022-11-04 | Stop reason: HOSPADM

## 2022-11-04 RX ORDER — TAMSULOSIN HYDROCHLORIDE 0.4 MG/1
0.4 CAPSULE ORAL DAILY
Qty: 5 CAPSULE | Refills: 0 | Status: SHIPPED | OUTPATIENT
Start: 2022-11-04 | End: 2022-11-09 | Stop reason: SDUPTHER

## 2022-11-04 RX ORDER — ONDANSETRON 4 MG/1
4 TABLET, ORALLY DISINTEGRATING ORAL EVERY 8 HOURS PRN
Qty: 10 TABLET | Refills: 0 | Status: SHIPPED | OUTPATIENT
Start: 2022-11-04

## 2022-11-04 RX ORDER — ONDANSETRON 2 MG/ML
4 INJECTION INTRAMUSCULAR; INTRAVENOUS ONCE
Status: COMPLETED | OUTPATIENT
Start: 2022-11-04 | End: 2022-11-04

## 2022-11-04 RX ORDER — 0.9 % SODIUM CHLORIDE 0.9 %
1000 INTRAVENOUS SOLUTION INTRAVENOUS ONCE
Status: COMPLETED | OUTPATIENT
Start: 2022-11-04 | End: 2022-11-04

## 2022-11-04 RX ORDER — OXYCODONE HYDROCHLORIDE AND ACETAMINOPHEN 5; 325 MG/1; MG/1
1 TABLET ORAL EVERY 8 HOURS PRN
Qty: 7 TABLET | Refills: 0 | Status: SHIPPED | OUTPATIENT
Start: 2022-11-04 | End: 2022-11-09 | Stop reason: SDUPTHER

## 2022-11-04 RX ORDER — KETOROLAC TROMETHAMINE 30 MG/ML
30 INJECTION, SOLUTION INTRAMUSCULAR; INTRAVENOUS ONCE
Status: COMPLETED | OUTPATIENT
Start: 2022-11-04 | End: 2022-11-04

## 2022-11-04 RX ADMIN — TAMSULOSIN HYDROCHLORIDE 0.4 MG: 0.4 CAPSULE ORAL at 03:27

## 2022-11-04 RX ADMIN — KETOROLAC TROMETHAMINE 30 MG: 30 INJECTION, SOLUTION INTRAMUSCULAR at 01:23

## 2022-11-04 RX ADMIN — MORPHINE SULFATE 4 MG: 4 INJECTION, SOLUTION INTRAMUSCULAR; INTRAVENOUS at 03:28

## 2022-11-04 RX ADMIN — ONDANSETRON 4 MG: 2 INJECTION INTRAMUSCULAR; INTRAVENOUS at 01:23

## 2022-11-04 RX ADMIN — SODIUM CHLORIDE 1000 ML: 9 INJECTION, SOLUTION INTRAVENOUS at 01:21

## 2022-11-04 ASSESSMENT — PAIN DESCRIPTION - DESCRIPTORS
DESCRIPTORS: ACHING
DESCRIPTORS: THROBBING

## 2022-11-04 ASSESSMENT — PAIN DESCRIPTION - FREQUENCY: FREQUENCY: CONTINUOUS

## 2022-11-04 ASSESSMENT — PAIN DESCRIPTION - ORIENTATION
ORIENTATION: LEFT;LOWER

## 2022-11-04 ASSESSMENT — PAIN SCALES - GENERAL
PAINLEVEL_OUTOF10: 5
PAINLEVEL_OUTOF10: 2
PAINLEVEL_OUTOF10: 8

## 2022-11-04 ASSESSMENT — LIFESTYLE VARIABLES
HOW MANY STANDARD DRINKS CONTAINING ALCOHOL DO YOU HAVE ON A TYPICAL DAY: PATIENT DOES NOT DRINK
HOW OFTEN DO YOU HAVE A DRINK CONTAINING ALCOHOL: NEVER
HOW OFTEN DO YOU HAVE A DRINK CONTAINING ALCOHOL: NEVER
HOW MANY STANDARD DRINKS CONTAINING ALCOHOL DO YOU HAVE ON A TYPICAL DAY: PATIENT DOES NOT DRINK

## 2022-11-04 ASSESSMENT — PAIN DESCRIPTION - PAIN TYPE
TYPE: ACUTE PAIN
TYPE: ACUTE PAIN

## 2022-11-04 ASSESSMENT — PAIN DESCRIPTION - DIRECTION: RADIATING_TOWARDS: LLQ

## 2022-11-04 ASSESSMENT — PAIN DESCRIPTION - LOCATION
LOCATION: BACK

## 2022-11-04 ASSESSMENT — PAIN - FUNCTIONAL ASSESSMENT: PAIN_FUNCTIONAL_ASSESSMENT: 0-10

## 2022-11-04 NOTE — ED PROVIDER NOTES
[facility] ED  [address]  [place] [address]  Phone: [phone]      Pt Name: Jojo Small  QTI:[id number]  Clivegfbernarda 1960  Date of evaluation: 11/4/2022      CHIEF COMPLAINT       Chief Complaint   Patient presents with    Back Pain       HISTORY OF PRESENT ILLNESS   Jojo Small is a 58 y.o. male history of kidney stones, bilateral hip replacements and right knee replacement who presents for evaluation of left flank pain. The patient reports that starting yesterday began developing intermittent, sharp, stabbing, left flank pain that has become constant and increased in intensity since 11 PM tonight. He states that the pain is now radiating into his left side abdomen. He complains of nausea and 3 episodes of nonbilious nonbloody emesis. The patient took Tylenol without improvement. He does not list any provoking or palliating factors. The patient states that his pain feels like when he passed a kidney stone in the past.  He last passed a stone a few years ago. He does not have a urologist.  He has not required lithotripsy or stents. The patient denies taking any medications on a regular basis. He denies fever, chills, headache, vision changes, neck pain, chest pain, shortness of breath, urinary/bowel symptoms, dysuria, hematuria, testicular pain, history of abdominal surgeries, focal weakness, numbness, tingling, recent injury or illness. REVIEW OF SYSTEMS     Positive left flank pain, nausea, vomiting, left-sided abdominal pain  Ten point review of systems was reviewed and is negative unless otherwise noted in the HPI    [address]    has a past medical history of Anemia, Avascular necrosis ([address] Kidney stone, Mixed hyperlipidemia, Obesity, and Osteoarthritis. SURGICAL HISTORY      has a past surgical history that includes Total hip arthroplasty (Right, 12/16/2009); Total hip arthroplasty (Left, 10/28/2009);  Revision total hip arthroplasty (Left, 11/2013); other surgical history (Left, 08/10/2016); and Dennis tooth extraction. CURRENT MEDICATIONS       Previous Medications    ASPIRIN 81 MG TABLET    Take 81 mg by mouth daily. FERROUS SULFATE 325 (65 FE) MG TABLET    Take 325 mg by mouth every other day. LORATADINE (CLARITIN) 10 MG TABLET    Take 10 mg by mouth daily as needed    MULTIPLE VITAMINS-MINERALS (THERAPEUTIC MULTIVITAMIN-MINERALS) TABLET    Take 1 tablet by mouth daily. NAPROXEN SODIUM (ANAPROX) 220 MG TABLET    Take 220 mg by mouth 2 times daily as needed for Pain. ALLERGIES     is allergic to pcn [penicillins]. FAMILY HISTORY     He indicated that his mother is alive. He indicated that his father is . He indicated that his sister is alive. He indicated that two of his four brothers are alive. He indicated that his maternal grandmother is . He indicated that his maternal grandfather is . He indicated that his paternal grandmother is . He indicated that his paternal grandfather is . He indicated that his daughter is alive. family history includes COPD in his brother; Cancer in his father; Other in his brother; Stroke (age of onset: 76) in his father. SOCIAL HISTORY      reports that he has never smoked. He has never used smokeless tobacco. He reports current alcohol use of about 5.0 standard drinks per week. He reports that he does not use drugs. PHYSICAL EXAM     INITIAL VITALS:  height is 6' (1.829 m) and weight is 280 lb (127 kg). His tympanic temperature is 97.7 °F (36.5 °C). His blood pressure is 152/87 (abnormal) and his pulse is 74. His respiration is 18 and oxygen saturation is 92%.      CONSTITUTIONAL: Appears uncomfortable, nontoxic   SKIN: warm, dry, no jaundice, hives or petechiae  EYES: clear conjunctiva, non-icteric sclera  HENT: normocephalic, atraumatic, moist mucus membranes  NECK: Nontender and supple with no nuchal rigidity, full range of motion  PULMONARY: clear to imaging to include all anatomy FINDINGS:  System: There is a 5 mm calculus at the left UPJ contributing to moderate hydronephrosis and mild perinephric stranding. A nonobstructing calculus is present within the right renal pelvis. The right ureter is normal.  Poor visualization of the bladder due to artifact from bilateral hip prostheses. Lower Chest:  The lung bases are clear. The base of the heart is normal. Organs: The liver, gallbladder, biliary ducts, pancreas and spleen are normal.  Adrenal glands are normal. GI/Bowel: The stomach, duodenum and small bowel are normal. A normal appendix is visualized. The colon is normal. Pelvis: Prostate is not well visualized due to artifact. Peritoneum/Retroperitoneum: The aorta tapers normally. No lymph node enlargement. Bones/Soft Tissues: Degenerative changes throughout the lumbar spine. Bilateral total hip arthroplasty. 1. 5 mm calculus at the left UPJ with moderate hydronephrosis. 2. Nonobstructing right renal calculus.         LABS:  Results for orders placed or performed during the hospital encounter of 11/04/22   Urinalysis with Reflex to Culture    Specimen: Urine, clean catch   Result Value Ref Range    Glucose, Ur NEGATIVE NEGATIVE    Bilirubin Urine NEGATIVE NEGATIVE    Ketones, Urine NEGATIVE NEGATIVE    Specific Gravity, UA 1.030 (H) 1.010 - 1.025    Urine Hgb 3+ (A) NEGATIVE    pH, UA 6.0 5.0 - 6.0    Protein, UA 1+ (A) NEGATIVE    Urobilinogen, Urine Normal Normal    Nitrite, Urine NEGATIVE NEGATIVE    Leukocyte Esterase, Urine NEGATIVE NEGATIVE   CBC with Auto Differential   Result Value Ref Range    WBC 10.9 3.5 - 11.3 k/uL    RBC 4.17 (L) 4.21 - 5.77 m/uL    Hemoglobin 12.6 (L) 13.0 - 17.0 g/dL    Hematocrit 38.4 (L) 40.7 - 50.3 %    MCV 92.1 82.6 - 102.9 fL    MCH 30.2 25.2 - 33.5 pg    MCHC 32.8 25.2 - 33.5 g/dL    RDW 14.1 11.8 - 14.4 %    Platelets 158 878 - 876 k/uL    MPV 10.3 8.1 - 13.5 fL    Seg Neutrophils 68 (H) 36 - 65 %    Lymphocytes 22 (L) 24 - 43 %    Monocytes 8 3 - 12 %    Eosinophils % 1 1 - 4 %    Basophils 0 0 - 2 %    Immature Granulocytes 0 0 %    Segs Absolute 7.38 1.50 - 8.10 k/uL    Absolute Lymph # 2.40 1.10 - 3.70 k/uL    Absolute Mono # 0.89 0.10 - 1.20 k/uL    Absolute Eos # 0.14 0.00 - 0.44 k/uL    Basophils Absolute 0.03 0.00 - 0.20 k/uL    Absolute Immature Granulocyte <0.03 0.00 - 0.30 k/uL   CMP   Result Value Ref Range    Glucose 169 (H) 70 - 99 mg/dL    BUN 21 8 - 23 mg/dL    Creatinine 1.12 0.70 - 1.20 mg/dL    Est, Glom Filt Rate >60 >60 mL/min/1.73m2    Bun/Cre Ratio 19 9 - 20    Calcium 10.2 8.6 - 10.4 mg/dL    Sodium 142 135 - 144 mmol/L    Potassium 3.9 3.7 - 5.3 mmol/L    Chloride 104 98 - 107 mmol/L    CO2 22 20 - 31 mmol/L    Anion Gap 16 9 - 17 mmol/L    Alkaline Phosphatase 69 40 - 129 U/L    ALT 23 5 - 41 U/L    AST 23 <40 U/L    Total Bilirubin 0.3 0.3 - 1.2 mg/dL    Total Protein 7.4 6.4 - 8.3 g/dL    Albumin 4.5 3.5 - 5.2 g/dL    Albumin/Globulin Ratio 1.6 1.0 - 2.5   Lipase   Result Value Ref Range    Lipase 28 13 - 60 U/L   Microscopic Urinalysis   Result Value Ref Range    WBC, UA 0 TO 4 0 - 4 /HPF    RBC, UA >50 0 - 4 /HPF    Epithelial Cells UA 0 TO 4 0 - 5 /HPF    Bacteria, UA 1+ (A) None    Mucus, UA 3+ (A) None    Amorphous, UA 2+ (A) None       EMERGENCY DEPARTMENT COURSE:        The patient was given the following medications:  Orders Placed This Encounter   Medications    ketorolac (TORADOL) injection 30 mg    ondansetron (ZOFRAN) injection 4 mg    0.9 % sodium chloride bolus    morphine injection 4 mg    DISCONTD: tamsulosin (FLOMAX) capsule 0.4 mg    tamsulosin (FLOMAX) 0.4 MG capsule     Sig: Take 1 capsule by mouth daily for 5 doses     Dispense:  5 capsule     Refill:  0    oxyCODONE-acetaminophen (PERCOCET) 5-325 MG per tablet     Sig: Take 1 tablet by mouth every 8 hours as needed for Pain for up to 7 days.      Dispense:  7 tablet     Refill:  0    ondansetron (ZOFRAN ODT) 4 MG disintegrating tablet     Sig: Take 1 tablet by mouth every 8 hours as needed for Nausea     Dispense:  10 tablet     Refill:  0    tamsulosin (FLOMAX) capsule 0.4 mg        Vitals:    Vitals:    11/04/22 0115 11/04/22 0130 11/04/22 0215 11/04/22 0230   BP: (!) 180/93 (!) 166/87 (!) 151/73 (!) 152/87   Pulse: 80 84 72 74   Resp: 20 20 18 18   Temp:       TempSrc:       SpO2: 97% 98% 97% 92%   Weight:       Height:         -------------------------  BP: (!) 152/87, Temp: 97.7 °F (36.5 °C), Heart Rate: 74, Resp: 18    CONSULTS:  None    CRITICAL CARE:   None    PROCEDURES:  None    DIAGNOSIS/ MDM:   Albino Smith is a 58 y.o. male who presents with flank pain. Vital signs are stable. He has left CVA tenderness on exam.  CBC, CMP, lipase and urinalysis are grossly unremarkable other than hematuria. CT of the abdomen and pelvis without contrast shows a left 5 mm stone at the UPJ with moderate hydronephrosis. The patient's pain significantly improved with Toradol and morphine. His nausea resolved with IV fluids and Zofran. I suspect the patient's pain is secondary to his kidney stone. I have low suspicion for infection, pyelonephritis or sepsis. The patient was instructed to take Tylenol as needed for pain and to take Percocet for breakthrough pain. He was instructed to take Zofran as needed for nausea and to take the Flomax as prescribed. I do not see any signs of infection and did not start him on any antibiotics. He was instructed to strain his urine and to stay well-hydrated. He was instructed to follow-up with his PCP and with urology within 1 to 2 days and to return to the ER for worsening symptoms or any other concern. The patient understands that at this time there is no evidence for a more malignant underlying process, but also understands that early in the process of an illness or injury, an emergency department work-up can be falsely reassuring.   Routine discharge counseling was given, and the patient understands that worsening, changing or persistent symptoms should prompt a immediate call or follow-up with their primary care physician or return to the emergency department. The importance of appropriate follow-up was also discussed. I have reviewed the disposition diagnosis with the patient. I have answered their questions and given discharge instructions. They voiced understanding of these instructions and did not have any further questions or complaints. FINAL IMPRESSION      1. Left ureteral stone          DISPOSITION/PLAN   DISPOSITION Decision To Discharge 11/04/2022 03:18:02 AM        PATIENT REFERRED TO:  KEVIN Bourne  [address] [facility]  [phone]    Schedule an appointment as soon as possible for a visit in 2 days      Ursula Matos MD  [facility]  [phone]    Schedule an appointment as soon as possible for a visit in 2 days      [address] [place], [place] [address].  [phone]  Go to   If symptoms worsen    DISCHARGE MEDICATIONS:  New Prescriptions    ONDANSETRON (ZOFRAN ODT) 4 MG DISINTEGRATING TABLET    Take 1 tablet by mouth every 8 hours as needed for Nausea    OXYCODONE-ACETAMINOPHEN (PERCOCET) 5-325 MG PER TABLET    Take 1 tablet by mouth every 8 hours as needed for Pain for up to 7 days.     TAMSULOSIN (FLOMAX) 0.4 MG CAPSULE    Take 1 capsule by mouth daily for 5 doses       (Please note that portions of this note were completed with a voice recognitionprogram.  Efforts were made to edit the dictations but occasionally words are mis-transcribed.)    Ness Lei DO, [facility]  Emergency Physician Attending          Ness Lei DO  11/04/22 7100

## 2022-11-09 ENCOUNTER — OFFICE VISIT (OUTPATIENT)
Dept: UROLOGY | Age: 62
End: 2022-11-09
Payer: COMMERCIAL

## 2022-11-09 VITALS
HEIGHT: 72 IN | HEART RATE: 91 BPM | DIASTOLIC BLOOD PRESSURE: 84 MMHG | BODY MASS INDEX: 37.93 KG/M2 | SYSTOLIC BLOOD PRESSURE: 136 MMHG | WEIGHT: 280 LBS

## 2022-11-09 DIAGNOSIS — N20.1 LEFT URETERAL STONE: ICD-10-CM

## 2022-11-09 DIAGNOSIS — N20.1 URETERAL STONE: Primary | ICD-10-CM

## 2022-11-09 DIAGNOSIS — N50.89 SCROTAL SWELLING: ICD-10-CM

## 2022-11-09 DIAGNOSIS — N20.0 NEPHROLITHIASIS: ICD-10-CM

## 2022-11-09 PROCEDURE — 99204 OFFICE O/P NEW MOD 45 MIN: CPT | Performed by: UROLOGY

## 2022-11-09 RX ORDER — CIPROFLOXACIN 500 MG/1
500 TABLET, FILM COATED ORAL 2 TIMES DAILY
Qty: 14 TABLET | Refills: 0 | Status: SHIPPED | OUTPATIENT
Start: 2022-11-09

## 2022-11-09 RX ORDER — TAMSULOSIN HYDROCHLORIDE 0.4 MG/1
0.4 CAPSULE ORAL DAILY
Qty: 30 CAPSULE | Refills: 0 | Status: SHIPPED | OUTPATIENT
Start: 2022-11-09 | End: 2022-12-09

## 2022-11-09 RX ORDER — OXYCODONE HYDROCHLORIDE AND ACETAMINOPHEN 5; 325 MG/1; MG/1
1 TABLET ORAL EVERY 6 HOURS PRN
Qty: 20 TABLET | Refills: 0 | Status: SHIPPED | OUTPATIENT
Start: 2022-11-09 | End: 2022-11-16

## 2022-11-09 NOTE — PROGRESS NOTES
Roman Gross MD   Urology Clinic office Visit      Patient:  Tayo Capellan  YOB: 1960  Date: 11/9/2022    HISTORY OF PRESENT ILLNESS:   The patient is a 58 y.o. male who presents today for evaluation of the following problem(s):      1. Ureteral stone    2. Nephrolithiasis    3. Left ureteral stone    4. Scrotal swelling           Overall the problem(s) : are worsening. Associated Symptoms: No dysuria, gross hematuria. Pain Severity:      Summary of old records: hx of stones  (Patient's old records, notes and chart reviewed and summarized above.)      Onset 4-5 days  Left sided pain  Intermittent  Sharp, severe  No hematuria, no LUTS, no UTS  Noted to have 5 mm stone left UPJ  Has not passed the stone  Has been taking flomax daily, Percocet , zofran     I independently reviewed and verified the images and reports from:    CT ABDOMEN PELVIS WO CONTRAST Additional Contrast? None    Result Date: 11/4/2022  EXAMINATION: CT OF THE ABDOMEN AND PELVIS WITHOUT CONTRAST 11/4/2022 2:02 am TECHNIQUE: CT of the abdomen and pelvis was performed without the administration of intravenous contrast. Multiplanar reformatted images are provided for review. Automated exposure control, iterative reconstruction, and/or weight based adjustment of the mA/kV was utilized to reduce the radiation dose to as low as reasonably achievable. COMPARISON: 02/21/2016 CT HISTORY: ORDERING SYSTEM PROVIDED HISTORY: left flank pain radiating to left abdomen, h/o kidney stones TECHNOLOGIST PROVIDED HISTORY: left flank pain radiating to left abdomen,  h/o kidney stones Decision Support Exception - unselect if not a suspected or confirmed emergency medical condition->Emergency Medical Condition (MA) Reason for Exam: Left flank pain radiating to left abdomen, h/o kidney stones. Testicular swelling noticed on exam, extended imaging to include all anatomy FINDINGS:  System:  There is a 5 mm calculus at the left UPJ contributing to moderate hydronephrosis and mild perinephric stranding. A nonobstructing calculus is present within the right renal pelvis. The right ureter is normal.  Poor visualization of the bladder due to artifact from bilateral hip prostheses. Lower Chest:  The lung bases are clear. The base of the heart is normal. Organs: The liver, gallbladder, biliary ducts, pancreas and spleen are normal.  Adrenal glands are normal. GI/Bowel: The stomach, duodenum and small bowel are normal. A normal appendix is visualized. The colon is normal. Pelvis: Prostate is not well visualized due to artifact. Peritoneum/Retroperitoneum: The aorta tapers normally. No lymph node enlargement. Bones/Soft Tissues: Degenerative changes throughout the lumbar spine. Bilateral total hip arthroplasty. 1. 5 mm calculus at the left UPJ with moderate hydronephrosis. 2. Nonobstructing right renal calculus. Last several PSA's:  Lab Results   Component Value Date    PSA 2.64 03/11/2014       Last total testosterone:  No results found for: TESTOSTERONE    Urinalysis today:  No results found for this visit on 11/09/22. Last BUN and creatinine:  Lab Results   Component Value Date    BUN 21 11/04/2022     Lab Results   Component Value Date    CREATININE 1.12 11/04/2022       Imaging Reviewed during this Office Visit:   (results were independently reviewed by physician and radiology report verified)    PAST MEDICAL, FAMILY AND SOCIAL HISTORY:  Past Medical History:   Diagnosis Date    Anemia     Avascular necrosis ([address]     bilateral hips. S/P FAVIAN b/L    Kidney stone     Mixed hyperlipidemia     Obesity     Osteoarthritis     hips, knees     Past Surgical History:   Procedure Laterality Date    OTHER SURGICAL HISTORY Left 08/10/2016    cortisone injection-knee.   [address]  HIP ARTHROPLASTY Left 11/2013    Dr. Oli Faust Right 12/16/2009    TOTAL HIP ARTHROPLASTY Left 10/28/2009    WISDOM TOOTH EXTRACTION       Family History   Problem Relation Age of Onset    Stroke Father 76    Cancer Father         skin    Other Brother         sleep apnea    COPD Brother         tobacco abuse     Outpatient Medications Marked as Taking for the 11/9/22 encounter (Office Visit) with Cassandra Andrews MD   Medication Sig Dispense Refill    ciprofloxacin (CIPRO) 500 MG tablet Take 1 tablet by mouth 2 times daily 14 tablet 0    tamsulosin (FLOMAX) 0.4 MG capsule Take 1 capsule by mouth daily for 30 doses 30 capsule 0    oxyCODONE-acetaminophen (PERCOCET) 5-325 MG per tablet Take 1 tablet by mouth every 6 hours as needed for Pain for up to 7 days. 20 tablet 0    Multiple Vitamins-Minerals (THERAPEUTIC MULTIVITAMIN-MINERALS) tablet Take 1 tablet by mouth daily. aspirin 81 MG tablet Take 81 mg by mouth daily. Pcn [penicillins]  Social History     Tobacco Use   Smoking Status Never   Smokeless Tobacco Never       Social History     Substance and Sexual Activity   Alcohol Use Yes    Alcohol/week: 5.0 standard drinks    Types: 6 drink(s) per week    Comment: weekends       REVIEW OF SYSTEMS:  Constitutional: negative  Eyes: negative  Respiratory: negative  Cardiovascular: negative  Gastrointestinal: negative  Musculoskeletal: negative  Genitourinary: negative except for what is in HPI  Skin: negative   Neurological: negative  Hematological/Lymphatic: negative  Psychological: negative    Physical Exam:    This a 58 y.o. male   Vitals:    11/09/22 1128   BP: 136/84   Pulse: 91     Constitutional: Patient in no acute distress; Neuro: alert and oriented to person place and time. Psych: Mood and affect normal.  Skin: Normal  Lungs: Respiratory effort normal  Cardiovascular:  Normal peripheral pulses  Abdomen: Soft, non-tender, non-distended   Bladder non-tender and not distended.   Lymphatics: no palpable lymphadenopathy  Gait is within normal limits  Musculoskeletal: Normal range of motion   Left scrotal swelling      Assessment and Plan      1. Ureteral stone    2. Nephrolithiasis    3. Left ureteral stone    4. Scrotal swelling           Stone management: Discussed all options of stone management- Medical expulsion therapy, surgery in form of ureteroscopy, ESWL or trial of passage. Discussed risks, benefits, alternatives of each. Discussed complication and expectations. 5 mm UPJ left stone      Pt would like to try MET  Continue flomax and percocet   Increase fluids > 60oz daily. Strain all urine. Follow up 1 month with KUB and US. Discussed warning signs or fevers, chills, increased pain      Left scrotal swelling: x 2 months, will check scrotal US      Fu 1 month    Prescriptions Ordered:  Orders Placed This Encounter   Medications    ciprofloxacin (CIPRO) 500 MG tablet     Sig: Take 1 tablet by mouth 2 times daily     Dispense:  14 tablet     Refill:  0    tamsulosin (FLOMAX) 0.4 MG capsule     Sig: Take 1 capsule by mouth daily for 30 doses     Dispense:  30 capsule     Refill:  0    oxyCODONE-acetaminophen (PERCOCET) 5-325 MG per tablet     Sig: Take 1 tablet by mouth every 6 hours as needed for Pain for up to 7 days. Dispense:  20 tablet     Refill:  0     Reduce doses taken as pain becomes manageable        Orders Placed:  Orders Placed This Encounter   Procedures    XR ABDOMEN (KUB) (SINGLE AP VIEW)     Standing Status:   Future     Standing Expiration Date:   11/9/2023    US RENAL COMPLETE     Standing Status:   Future     Standing Expiration Date:   11/10/2023    US SCROTUM AND TESTICLES     Please schedule with doppler     Standing Status:   Future     Standing Expiration Date:   11/4/2023            Luciana Buckley M.D, MD    No follow-ups on file.       Nii Bai MD  [facility]

## 2022-11-18 ENCOUNTER — HOSPITAL ENCOUNTER (OUTPATIENT)
Dept: INTERVENTIONAL RADIOLOGY/VASCULAR | Age: 62
Discharge: HOME | End: 2022-11-20
Payer: COMMERCIAL

## 2022-11-18 ENCOUNTER — HOSPITAL ENCOUNTER (OUTPATIENT)
Dept: GENERAL RADIOLOGY | Age: 62
Discharge: HOME | End: 2022-11-20
Payer: COMMERCIAL

## 2022-11-18 DIAGNOSIS — N50.89 SCROTAL SWELLING: ICD-10-CM

## 2022-11-18 DIAGNOSIS — N20.1 URETERAL STONE: ICD-10-CM

## 2022-11-18 PROCEDURE — 76770 US EXAM ABDO BACK WALL COMP: CPT

## 2022-11-18 PROCEDURE — 93976 VASCULAR STUDY: CPT

## 2022-11-18 PROCEDURE — 74018 RADEX ABDOMEN 1 VIEW: CPT

## 2022-12-07 ENCOUNTER — OFFICE VISIT (OUTPATIENT)
Dept: UROLOGY | Age: 62
End: 2022-12-07
Payer: COMMERCIAL

## 2022-12-07 VITALS
HEIGHT: 72 IN | RESPIRATION RATE: 18 BRPM | SYSTOLIC BLOOD PRESSURE: 128 MMHG | BODY MASS INDEX: 37.82 KG/M2 | DIASTOLIC BLOOD PRESSURE: 84 MMHG | TEMPERATURE: 98.4 F | WEIGHT: 279.2 LBS | HEART RATE: 88 BPM

## 2022-12-07 DIAGNOSIS — N20.1 URETERAL STONE: Primary | ICD-10-CM

## 2022-12-07 DIAGNOSIS — N20.0 NEPHROLITHIASIS: ICD-10-CM

## 2022-12-07 DIAGNOSIS — N20.1 LEFT URETERAL STONE: ICD-10-CM

## 2022-12-07 DIAGNOSIS — N50.89 SCROTAL SWELLING: ICD-10-CM

## 2022-12-07 PROCEDURE — 99214 OFFICE O/P EST MOD 30 MIN: CPT | Performed by: UROLOGY

## 2022-12-07 RX ORDER — TAMSULOSIN HYDROCHLORIDE 0.4 MG/1
0.4 CAPSULE ORAL DAILY
Qty: 30 CAPSULE | Refills: 0 | Status: SHIPPED | OUTPATIENT
Start: 2022-12-07 | End: 2023-01-06

## 2022-12-07 RX ORDER — LATANOPROST 50 UG/ML
SOLUTION/ DROPS OPHTHALMIC
COMMUNITY
Start: 2022-10-05

## 2022-12-07 NOTE — PROGRESS NOTES
Jean Humphries MD   Urology Clinic office Visit      Patient:  Stephanie Au  YOB: 1960  Date: 12/7/2022    HISTORY OF PRESENT ILLNESS:   The patient is a 58 y.o. male who presents today for evaluation of the following problem(s):      1. Ureteral stone    2. Nephrolithiasis    3. Left ureteral stone    4. Scrotal swelling           Overall the problem(s) : are improving  Associated Symptoms: No dysuria, gross hematuria. Pain Severity: Pain Score:   0 - No pain    Summary of old records: hx of stones  (Patient's old records, notes and chart reviewed and summarized above.)      Onset 4-5 days  Left sided pain  Intermittent  Sharp, severe  No hematuria, no LUTS, no UTS  Noted to have 5 mm stone left UPJ  Has not passed the stone  Has been taking flomax daily, Percocet , zofran     I independently reviewed and verified the images and reports from:    XR ABDOMEN (KUB) (SINGLE AP VIEW)    Result Date: 11/19/2022  EXAMINATION: ONE SUPINE XRAY VIEW(S) OF THE ABDOMEN 11/18/2022 1:39 pm COMPARISON: None. HISTORY: ORDERING SYSTEM PROVIDED HISTORY: Ureteral stone TECHNOLOGIST PROVIDED HISTORY: Reason for Exam: Ureteral stone on left side FINDINGS: Bowel gas pattern nonobstructed. Renal shadows partially obscured by bowel gas and fecal debris. 16 mm calcification regional to the mid right kidney. No definite left renal or ureteral stones demonstrated. Degenerative changes spine. Bilateral hip prosthesis noted. Nonobstructed bowel-gas pattern. Right nephrolithiasis. US RENAL COMPLETE    Result Date: 11/18/2022  EXAMINATION: RETROPERITONEAL ULTRASOUND OF THE KIDNEYS AND URINARY BLADDER 11/18/2022 COMPARISON: None HISTORY: ORDERING SYSTEM PROVIDED HISTORY: Ureteral stone TECHNOLOGIST PROVIDED HISTORY: Reason for Exam: ureteral stone FINDINGS: Kidneys: The right kidney measures 13.4 cm in length and the left kidney measures 13.7 cm in length. Normal renal cortical echogenicity.   Nonspecific intravenous contrast. Multiplanar reformatted images are provided for review. Automated exposure control, iterative reconstruction, and/or weight based adjustment of the mA/kV was utilized to reduce the radiation dose to as low as reasonably achievable. COMPARISON: 02/21/2016 CT HISTORY: ORDERING SYSTEM PROVIDED HISTORY: left flank pain radiating to left abdomen, h/o kidney stones TECHNOLOGIST PROVIDED HISTORY: left flank pain radiating to left abdomen,  h/o kidney stones Decision Support Exception - unselect if not a suspected or confirmed emergency medical condition->Emergency Medical Condition (MA) Reason for Exam: Left flank pain radiating to left abdomen, h/o kidney stones. Testicular swelling noticed on exam, extended imaging to include all anatomy FINDINGS:  System: There is a 5 mm calculus at the left UPJ contributing to moderate hydronephrosis and mild perinephric stranding. A nonobstructing calculus is present within the right renal pelvis. The right ureter is normal.  Poor visualization of the bladder due to artifact from bilateral hip prostheses. Lower Chest:  The lung bases are clear. The base of the heart is normal. Organs: The liver, gallbladder, biliary ducts, pancreas and spleen are normal.  Adrenal glands are normal. GI/Bowel: The stomach, duodenum and small bowel are normal. A normal appendix is visualized. The colon is normal. Pelvis: Prostate is not well visualized due to artifact. Peritoneum/Retroperitoneum: The aorta tapers normally. No lymph node enlargement. Bones/Soft Tissues: Degenerative changes throughout the lumbar spine. Bilateral total hip arthroplasty. 1. 5 mm calculus at the left UPJ with moderate hydronephrosis. 2. Nonobstructing right renal calculus.          Last several PSA's:  Lab Results   Component Value Date    PSA 2.64 03/11/2014       Last total testosterone:  No results found for: TESTOSTERONE    Urinalysis today:  No results found for this visit on 12/07/22. Last BUN and creatinine:  Lab Results   Component Value Date    BUN 21 11/04/2022     Lab Results   Component Value Date    CREATININE 1.12 11/04/2022       Imaging Reviewed during this Office Visit:   (results were independently reviewed by physician and radiology report verified)    PAST MEDICAL, FAMILY AND SOCIAL HISTORY:  Past Medical History:   Diagnosis Date    Anemia     Avascular necrosis ([address]     bilateral hips. S/P FAVIAN b/L    Kidney stone     Mixed hyperlipidemia     Obesity     Osteoarthritis     hips, knees     Past Surgical History:   Procedure Laterality Date    OTHER SURGICAL HISTORY Left 08/10/2016    cortisone injection-knee. [address] [facility]    REVISION TOTAL HIP ARTHROPLASTY Left 11/2013    Dr. Radha Suggs Right 12/16/2009    TOTAL HIP ARTHROPLASTY Left 10/28/2009    WISDOM TOOTH EXTRACTION       Family History   Problem Relation Age of Onset    Stroke Father 76    Cancer Father         skin    Other Brother         sleep apnea    COPD Brother         tobacco abuse     Outpatient Medications Marked as Taking for the 12/7/22 encounter (Office Visit) with Selene Roldan MD   Medication Sig Dispense Refill    latanoprost (XALATAN) 0.005 % ophthalmic solution INSTILL 1 DROP INTO EACH EYE IN THE EVENING      zinc 50 MG CAPS Take by mouth      tamsulosin (FLOMAX) 0.4 MG capsule Take 1 capsule by mouth daily for 30 doses 30 capsule 0    ciprofloxacin (CIPRO) 500 MG tablet Take 1 tablet by mouth 2 times daily 14 tablet 0    naproxen sodium (ANAPROX) 220 MG tablet Take 220 mg by mouth 2 times daily as needed for Pain      Multiple Vitamins-Minerals (THERAPEUTIC MULTIVITAMIN-MINERALS) tablet Take 1 tablet by mouth daily. aspirin 81 MG tablet Take 81 mg by mouth daily. ferrous sulfate 325 (65 FE) MG tablet Take 325 mg by mouth every other day.          Pcn [penicillins]  Social History     Tobacco Use   Smoking Status Never   Smokeless Tobacco Never Social History     Substance and Sexual Activity   Alcohol Use Yes    Alcohol/week: 5.0 standard drinks    Types: 6 drink(s) per week    Comment: weekends       REVIEW OF SYSTEMS:  Constitutional: negative  Eyes: negative  Respiratory: negative  Cardiovascular: negative  Gastrointestinal: negative  Musculoskeletal: negative  Genitourinary: negative except for what is in HPI  Skin: negative   Neurological: negative  Hematological/Lymphatic: negative  Psychological: negative    Physical Exam:    This a 58 y.o. male   Vitals:    12/07/22 0819   BP: 128/84   Pulse: 88   Resp: 18   Temp: 98.4 °F (36.9 °C)     Constitutional: Patient in no acute distress; Neuro: alert and oriented to person place and time. Psych: Mood and affect normal.  Skin: Normal  Lungs: Respiratory effort normal  Cardiovascular:  Normal peripheral pulses  Abdomen: Soft, non-tender, non-distended   Bladder non-tender and not distended. Lymphatics: no palpable lymphadenopathy  Gait is within normal limits  Musculoskeletal: Normal range of motion   Left scrotal swelling      Assessment and Plan      1. Ureteral stone    2. Nephrolithiasis    3. Left ureteral stone    4. Scrotal swelling             5 mm UPJ left stone-  Has had no pain since 10 days ago,  Has not definitively passed the stone  Discussed most definitive way to know if stone is there or not is another CT, URS, or surgery  Discussed observation- discussed risks of this  Discussed KUB and US results  Right kidney stone- will need tx.  Discussed ESWL    Repeat CT in 3-4 weeks once back from vacation  Fu to review 4-6 weeks    Scrotal swelling- normal AMADEO        Prescriptions Ordered:  Orders Placed This Encounter   Medications    tamsulosin (FLOMAX) 0.4 MG capsule     Sig: Take 1 capsule by mouth daily for 30 doses     Dispense:  30 capsule     Refill:  0          Orders Placed:  Orders Placed This Encounter   Procedures    CT ABDOMEN PELVIS WO CONTRAST Additional Contrast? None Standing Status:   Future     Standing Expiration Date:   12/7/2023     Order Specific Question:   Additional Contrast?     Answer:   None              Radha Montez M.D, MD    No follow-ups on file.       Yaa Roa MD  [facility]

## 2023-01-10 ENCOUNTER — HOSPITAL ENCOUNTER (OUTPATIENT)
Dept: CT IMAGING | Age: 63
Discharge: HOME | End: 2023-01-12
Payer: COMMERCIAL

## 2023-01-10 DIAGNOSIS — N20.1 URETERAL STONE: ICD-10-CM

## 2023-01-10 DIAGNOSIS — N20.1 LEFT URETERAL STONE: ICD-10-CM

## 2023-01-10 DIAGNOSIS — N20.0 NEPHROLITHIASIS: ICD-10-CM

## 2023-01-10 PROCEDURE — 74176 CT ABD & PELVIS W/O CONTRAST: CPT

## 2023-01-12 RX ORDER — TAMSULOSIN HYDROCHLORIDE 0.4 MG/1
0.4 CAPSULE ORAL DAILY
Qty: 30 CAPSULE | Refills: 0 | Status: SHIPPED | OUTPATIENT
Start: 2023-01-12 | End: 2023-02-11

## 2023-01-18 ENCOUNTER — OFFICE VISIT (OUTPATIENT)
Dept: UROLOGY | Age: 63
End: 2023-01-18
Payer: COMMERCIAL

## 2023-01-18 VITALS
RESPIRATION RATE: 20 BRPM | WEIGHT: 279 LBS | BODY MASS INDEX: 37.79 KG/M2 | HEIGHT: 72 IN | DIASTOLIC BLOOD PRESSURE: 80 MMHG | HEART RATE: 72 BPM | SYSTOLIC BLOOD PRESSURE: 136 MMHG | OXYGEN SATURATION: 98 %

## 2023-01-18 DIAGNOSIS — N20.1 URETERAL STONE: Primary | ICD-10-CM

## 2023-01-18 DIAGNOSIS — N20.1 LEFT URETERAL STONE: ICD-10-CM

## 2023-01-18 DIAGNOSIS — N20.0 NEPHROLITHIASIS: ICD-10-CM

## 2023-01-18 DIAGNOSIS — N50.89 SCROTAL SWELLING: ICD-10-CM

## 2023-01-18 PROCEDURE — 99214 OFFICE O/P EST MOD 30 MIN: CPT | Performed by: UROLOGY

## 2023-01-18 NOTE — PROGRESS NOTES
Milady Britt MD   Urology Clinic office Visit      Patient:  Jamaica Gonzalez  YOB: 1960  Date: 1/18/2023    HISTORY OF PRESENT ILLNESS:   The patient is a 58 y.o. male who presents today for evaluation of the following problem(s):      1. Ureteral stone    2. Nephrolithiasis    3. Left ureteral stone    4. Scrotal swelling           Overall the problem(s) : are improving  Associated Symptoms: No dysuria, gross hematuria. Pain Severity:      Summary of old records: hx of stones  (Patient's old records, notes and chart reviewed and summarized above.)      Onset 11/2022  Left sided pain  Intermittent  Sharp, severe  No hematuria, no LUTS, no UTS  Noted to have 5 mm stone left UPJ  Has not passed the stone  Has been taking flomax daily, Percocet , zofran     I independently reviewed and verified the images and reports from:    CT ABDOMEN PELVIS WO CONTRAST Additional Contrast? None    Addendum Date: 1/10/2023    ADDENDUM: There is a partially imaged left inguinoscrotal hernia containing nonobstructed descending colon. No major change from prior. Result Date: 1/10/2023  EXAMINATION: CT OF THE ABDOMEN AND PELVIS WITHOUT CONTRAST 1/10/2023 9:33 am TECHNIQUE: CT of the abdomen and pelvis was performed without the administration of intravenous contrast. Multiplanar reformatted images are provided for review. Automated exposure control, iterative reconstruction, and/or weight based adjustment of the mA/kV was utilized to reduce the radiation dose to as low as reasonably achievable. COMPARISON: 11/04/2022 HISTORY: ORDERING SYSTEM PROVIDED HISTORY: Ureteral stone TECHNOLOGIST PROVIDED HISTORY: Reason for Exam: Left flank pain; kidney stone FINDINGS: Lower Chest: The visualized heart and lungs show no acute abnormalities. Organs: 1.5 cm nonobstructing calculus right renal pelvis. Unchanged. Interval resolution of left hydronephrosis.   Previously noted 4.5 mm calculus at the left ureteropelvic junction is no longer present. Visualized left ureter is unremarkable. Streak artifact from hip prosthesis obscures the distal ureter in the pelvis. The unenhanced liver, spleen, pancreas, adrenal glands and gallbladder show no significant abnormalities. GI/Bowel: There is limited evaluation due to absence of oral contrast. The stomach shows no focal lesions. Small bowel loops normal in caliber showing no focal abnormalities. Normal appendix. Evaluation of the colon shows no acute process. Pelvis: Bilateral hip prosthesis present. This causes large amount of streak artifacts obscuring the lower pelvis. Visualized pelvic structures show no acute process. Peritoneum/Retroperitoneum: No free fluid. No lymphadenopathy. Atherosclerotic disease. Bones/Soft Tissues: Diffuse degenerative changes. Bilateral total hip prosthesis. 1. Unchanged 1.5 cm nonobstructing calculus right renal pelvis. 2. Intra solution of left hydronephrosis with passage of the previously noted 4.5 mm calculus. No left ureteric calculus. No duct large amount of streak artifact from bilateral hip prosthesis obscures the distal left ureter in the pelvis. XR ABDOMEN (KUB) (SINGLE AP VIEW) Result Date: 11/19/2022 Non obstructed bowel-gas pattern. Right nephrolithiasis. US RENAL COMPLETE Result Date: 11/18/2022  Nonspecific minimal dilatation left renal pelvis with no hydronephrosis. Exam otherwise unremarkable     US SCROTUM W LIMITED DUPLEX  Result Date: 11/18/2022  Unremarkable testicular ultrasound with normal Doppler flow. Normal epididymis. Nonspecific diffuse scrotal wall edema. CT ABDOMEN PELVIS WO CONTRAST Additional Contrast? None  Result Date: 11/4/2022  1. 5 mm calculus at the left UPJ with moderate hydronephrosis. 2. Nonobstructing right renal calculus.          Last several PSA's:  Lab Results   Component Value Date    PSA 2.64 03/11/2014       Last total testosterone:  No results found for: TESTOSTERONE    Urinalysis today:  No results found for this visit on 01/18/23. Last BUN and creatinine:  Lab Results   Component Value Date    BUN 21 11/04/2022     Lab Results   Component Value Date    CREATININE 1.12 11/04/2022       Imaging Reviewed during this Office Visit:   (results were independently reviewed by physician and radiology report verified)    PAST MEDICAL, FAMILY AND SOCIAL HISTORY:  Past Medical History:   Diagnosis Date    Anemia     Avascular necrosis ([address]     bilateral hips. S/P FAVIAN b/L    Kidney stone     Mixed hyperlipidemia     Obesity     Osteoarthritis     hips, knees     Past Surgical History:   Procedure Laterality Date    OTHER SURGICAL HISTORY Left 08/10/2016    cortisone injection-knee. [address] [facility]    REVISION TOTAL HIP ARTHROPLASTY Left 11/2013    Dr. Ivett Raygoza Right 12/16/2009    TOTAL HIP ARTHROPLASTY Left 10/28/2009    WISDOM TOOTH EXTRACTION       Family History   Problem Relation Age of Onset    Stroke Father 76    Cancer Father         skin    Other Brother         sleep apnea    COPD Brother         tobacco abuse     Outpatient Medications Marked as Taking for the 1/18/23 encounter (Office Visit) with Estefani Pierre MD   Medication Sig Dispense Refill    tamsulosin (FLOMAX) 0.4 MG capsule Take 1 capsule by mouth daily for 30 doses 30 capsule 0    latanoprost (XALATAN) 0.005 % ophthalmic solution INSTILL 1 DROP INTO EACH EYE IN THE EVENING      ciprofloxacin (CIPRO) 500 MG tablet Take 1 tablet by mouth 2 times daily 14 tablet 0    ondansetron (ZOFRAN ODT) 4 MG disintegrating tablet Take 1 tablet by mouth every 8 hours as needed for Nausea 10 tablet 0    loratadine (CLARITIN) 10 MG tablet Take 10 mg by mouth daily as needed      naproxen sodium (ANAPROX) 220 MG tablet Take 220 mg by mouth 2 times daily as needed for Pain      Multiple Vitamins-Minerals (THERAPEUTIC MULTIVITAMIN-MINERALS) tablet Take 1 tablet by mouth daily. aspirin 81 MG tablet Take 81 mg by mouth daily.      ferrous sulfate 325 (65 FE) MG tablet Take 325 mg by mouth every other day.         Pcn [penicillins]  Social History     Tobacco Use   Smoking Status Never   Smokeless Tobacco Never       Social History     Substance and Sexual Activity   Alcohol Use Yes    Alcohol/week: 5.0 standard drinks    Types: 6 drink(s) per week    Comment: weekends       REVIEW OF SYSTEMS:  Constitutional: negative  Eyes: negative  Respiratory: negative  Cardiovascular: negative  Gastrointestinal: negative  Musculoskeletal: negative  Genitourinary: negative except for what is in HPI  Skin: negative   Neurological: negative  Hematological/Lymphatic: negative  Psychological: negative    Physical Exam:    This a 62 y.o. male   Vitals:    01/18/23 0939   BP: 136/80   Pulse: 72   Resp: 20   SpO2: 98%     Constitutional: Patient in no acute distress;   Neuro: alert and oriented to person place and time.    Psych: Mood and affect normal.  Skin: Normal  Lungs: Respiratory effort normal  Cardiovascular:  Normal peripheral pulses  Abdomen: Soft, non-tender, non-distended   Bladder non-tender and not distended.  Lymphatics: no palpable lymphadenopathy  Gait is within normal limits  Musculoskeletal: Normal range of motion   Left scrotal swelling      Assessment and Plan      1. Ureteral stone    2. Nephrolithiasis    3. Left ureteral stone    4. Scrotal swelling           Scrotal swelling- normal AMADEO    5 mm UPJ left stone- passed, no longer seen on most recent CT  Discussed KUB and US results  1.5 cm Right kidney stone- will need tx. Discussed ESWL; persistently seen on CT    Discussed treatment options    Stone management: Discussed all options of stone management- Medical expulsion therapy, surgery in form of ureteroscopy, ESWL or PCNL. Discussed risks, benefits, alternatives of each. Discussed complication and expectations.  Risks: I discussed all the risks, benefits, alternatives and possible  complications or surgery as well as expectations and post-op recovery. He is considered about insurance coverage for:    Right PCNL or Cysto right stent placement with right ESWL, or cystoscopy right ureteroscopy HLL and stent      He is leaning towards; cystoscopy right ureteroscopy HLL and stent placement  Will arrange, Ucx      Prescriptions Ordered:  No orders of the defined types were placed in this encounter. Orders Placed:  No orders of the defined types were placed in this encounter. Candida Abarca M.D, MD    No follow-ups on file.       Anna Farmer MD  [facility]

## 2023-01-19 ENCOUNTER — TELEPHONE (OUTPATIENT)
Dept: UROLOGY | Age: 63
End: 2023-01-19

## 2023-01-19 DIAGNOSIS — Z01.818 PRE-OP TESTING: Primary | ICD-10-CM

## 2023-01-19 NOTE — TELEPHONE ENCOUNTER
Patient seen Johnny Wall on 1/18/23. Johnny Wall had discussed patient having a Kidney Stone removed. Patient would like to get that schedule.  Can contacts patient at # [phone]

## 2023-01-20 ENCOUNTER — HOSPITAL ENCOUNTER (OUTPATIENT)
Dept: NON INVASIVE DIAGNOSTICS | Age: 63
Discharge: HOME | End: 2023-01-20
Payer: COMMERCIAL

## 2023-01-20 DIAGNOSIS — Z01.818 PRE-OP TESTING: ICD-10-CM

## 2023-01-20 LAB
EKG ATRIAL RATE: 76 BPM
EKG P AXIS: 71 DEGREES
EKG P-R INTERVAL: 170 MS
EKG Q-T INTERVAL: 384 MS
EKG QRS DURATION: 114 MS
EKG QTC CALCULATION (BAZETT): 432 MS
EKG R AXIS: -67 DEGREES
EKG T AXIS: 62 DEGREES
EKG VENTRICULAR RATE: 76 BPM

## 2023-01-20 PROCEDURE — 93005 ELECTROCARDIOGRAM TRACING: CPT

## 2023-01-24 ENCOUNTER — TELEPHONE (OUTPATIENT)
Dept: UROLOGY | Age: 63
End: 2023-01-24

## 2023-01-24 NOTE — TELEPHONE ENCOUNTER
[facility]    Pre-Operative Evaluation/Consultation    Name:  Suzanne Mercedes                                         Age:  58 y.o. MRN:  [id number]       :  1960   Date:  2023         Sex: male    There were no encounter diagnoses. Surgeon:  Dr. Terrence Marie  Procedure (Planned):  cystoscopy right urteroscopy holmium right stent placement  Date Scheduled surgery: 2/15/23    Attending : No att. providers found    Primary Physician: Mohamud BROWN  Cardiologist: None    Type of Anesthesia Requested: General    Patient Medical history: Allergies   Allergen Reactions    Pcn [Penicillins] Other (See Comments)     Unknown rx     Patient Active Problem List   Diagnosis    Avascular necrosis ([address]    Obesity    Anemia    Mixed hyperlipidemia    Impaired glucose tolerance test     Past Medical History:   Diagnosis Date    Anemia     Avascular necrosis (HCC)     bilateral hips. S/P FAVIAN b/L    Kidney stone     Mixed hyperlipidemia     Obesity     Osteoarthritis     hips, knees     Past Surgical History:   Procedure Laterality Date    OTHER SURGICAL HISTORY Left 08/10/2016    cortisone injection-knee. [address] TOTAL HIP ARTHROPLASTY Left 2013    Dr. Juan Antonio Gamino Right 2009    TOTAL HIP ARTHROPLASTY Left 10/28/2009    WISDOM TOOTH EXTRACTION       Social History     Tobacco Use    Smoking status: Never    Smokeless tobacco: Never   Vaping Use    Vaping Use: Never used   Substance Use Topics    Alcohol use:  Yes     Alcohol/week: 5.0 standard drinks     Types: 6 drink(s) per week     Comment: weekends    Drug use: No     Medications:  Current Outpatient Medications   Medication Sig Dispense Refill    tamsulosin (FLOMAX) 0.4 MG capsule Take 1 capsule by mouth daily for 30 doses 30 capsule 0    latanoprost (XALATAN) 0.005 % ophthalmic solution INSTILL 1 DROP INTO EACH EYE IN THE EVENING      ciprofloxacin (CIPRO) 500 MG tablet Take 1 tablet by mouth 2 times daily 14 tablet 0    ondansetron (ZOFRAN ODT) 4 MG disintegrating tablet Take 1 tablet by mouth every 8 hours as needed for Nausea 10 tablet 0    loratadine (CLARITIN) 10 MG tablet Take 10 mg by mouth daily as needed      naproxen sodium (ANAPROX) 220 MG tablet Take 220 mg by mouth 2 times daily as needed for Pain      Multiple Vitamins-Minerals (THERAPEUTIC MULTIVITAMIN-MINERALS) tablet Take 1 tablet by mouth daily. aspirin 81 MG tablet Take 81 mg by mouth daily. ferrous sulfate 325 (65 FE) MG tablet Take 325 mg by mouth every other day. No current facility-administered medications for this visit. Scheduled Meds:  Continuous Infusions:  PRN Meds:. Prior to Admission medications    Medication Sig Start Date End Date Taking? Authorizing Provider   tamsulosin (FLOMAX) 0.4 MG capsule Take 1 capsule by mouth daily for 30 doses 1/12/23 2/11/23  Trent Graves MD   latanoprost (XALATAN) 0.005 % ophthalmic solution INSTILL 1 DROP INTO EACH EYE IN THE EVENING 10/5/22   Historical Provider, MD   ciprofloxacin (CIPRO) 500 MG tablet Take 1 tablet by mouth 2 times daily 11/9/22   Kali Ochoa MD   ondansetron (ZOFRAN ODT) 4 MG disintegrating tablet Take 1 tablet by mouth every 8 hours as needed for Nausea 11/4/22   Tessa Ojeda DO   loratadine (CLARITIN) 10 MG tablet Take 10 mg by mouth daily as needed    Historical Provider, MD   naproxen sodium (ANAPROX) 220 MG tablet Take 220 mg by mouth 2 times daily as needed for Pain    Historical Provider, MD   Multiple Vitamins-Minerals (THERAPEUTIC MULTIVITAMIN-MINERALS) tablet Take 1 tablet by mouth daily. Historical Provider, MD   aspirin 81 MG tablet Take 81 mg by mouth daily. Historical Provider, MD   ferrous sulfate 325 (65 FE) MG tablet Take 325 mg by mouth every other day.     Historical Provider, MD     Vital Signs (Current) [unfilled]    Weight:   Wt Readings from Last 1 Encounters:   01/18/23 279 lb (126.6 kg)     Height: Ht Readings from Last 1 Encounters:   01/18/23 6' (1.829 m)      BMI:  There is no height or weight on file to calculate BMI. Estimated body mass index is 37.84 kg/m² as calculated from the following:    Height as of 1/18/23: 6' (1.829 m). Weight as of 1/18/23: 279 lb (126.6 kg). body mass index is unknown because there is no height or weight on file. Cardiac Clearance: None   Medical Clearance:None   Appointment for surgery Clearance scheduled for:None     Preoperative Testing: These are the current and completed labs:  CBC:   Lab Results   Component Value Date/Time    WBC 10.9 11/04/2022 01:16 AM    RBC 4.17 11/04/2022 01:16 AM    HGB 12.6 11/04/2022 01:16 AM    HCT 38.4 11/04/2022 01:16 AM    MCV 92.1 11/04/2022 01:16 AM    RDW 14.1 11/04/2022 01:16 AM     11/04/2022 01:16 AM     CMP:   Lab Results   Component Value Date/Time     11/04/2022 01:16 AM    K 3.9 11/04/2022 01:16 AM     11/04/2022 01:16 AM    CO2 22 11/04/2022 01:16 AM    BUN 21 11/04/2022 01:16 AM    CREATININE 1.12 11/04/2022 01:16 AM    GFRAA >60 02/21/2016 08:35 PM    LABGLOM >60 11/04/2022 01:16 AM    GLUCOSE 169 11/04/2022 01:16 AM    PROT 7.4 11/04/2022 01:16 AM    CALCIUM 10.2 11/04/2022 01:16 AM    BILITOT 0.3 11/04/2022 01:16 AM    ALKPHOS 69 11/04/2022 01:16 AM    AST 23 11/04/2022 01:16 AM    ALT 23 11/04/2022 01:16 AM     POC Tests: No results for input(s): POCGLU, POCNA, POCK, POCCL, POCBUN, POCHEMO, POCHCT in the last 72 hours.   Coags  No results found for: PROTIME, INR, APTT  HCG (If Applicable) No results found for: PREGTESTUR, PREGSERUM, HCG, HCGQUANT   ABGs No results found for: PHART, PO2ART, TVD6EQR, POT9SAB, BEART, B1QDCOXM   Type & Screen (If Applicable)  No results found for: Sathish Samaniego    Additional ordered pre-operative testing:  []CBC    []ABG      [] BMP   []URINALYSIS   []CMP    []HCG   []COAGS PT/INR  []T&C  []LFTs   []TYPE AND SCREEN    [] EKG  [] Chest X-Ray  [] Other Radiology    [] Sent to Hospitalist None  [] Sent to Anesthesia for your review: None   [] Additional Orders: None     Comments:None   Requests: No Special requests    Signed: Danny Pacheco LPN 5/99/1002 6:95 PM

## 2023-01-30 ENCOUNTER — OFFICE VISIT (OUTPATIENT)
Dept: CARDIOLOGY | Age: 63
End: 2023-01-30
Payer: COMMERCIAL

## 2023-01-30 VITALS
BODY MASS INDEX: 39.01 KG/M2 | OXYGEN SATURATION: 95 % | HEIGHT: 72 IN | HEART RATE: 95 BPM | RESPIRATION RATE: 19 BRPM | DIASTOLIC BLOOD PRESSURE: 96 MMHG | WEIGHT: 288 LBS | SYSTOLIC BLOOD PRESSURE: 156 MMHG

## 2023-01-30 DIAGNOSIS — E78.2 MIXED HYPERLIPIDEMIA: ICD-10-CM

## 2023-01-30 DIAGNOSIS — R94.31 ABNORMAL ECG: ICD-10-CM

## 2023-01-30 DIAGNOSIS — Z01.810 PREOP CARDIOVASCULAR EXAM: Primary | ICD-10-CM

## 2023-01-30 PROCEDURE — 99204 OFFICE O/P NEW MOD 45 MIN: CPT | Performed by: INTERNAL MEDICINE

## 2023-01-30 NOTE — PROGRESS NOTES
Cardiology Consultation/Follow Up. Lalo Elaine  1960  [id number]    Today: 1/30/23    CC: Patient is here for Preop Urological Procedure. HPI:   Yordy Nicholson is here for Preop Urological Procedure. Had abnormal ECG. Referred to cardiology. Denies any cp, sob, orthopnea, pnd, le edema. Not too active due to joint issues. Past Medical:  Past Medical History:   Diagnosis Date    Anemia     Avascular necrosis ([address]     bilateral hips. S/P FAVIAN b/L    Kidney stone     Mixed hyperlipidemia     Obesity     Osteoarthritis     hips, knees         Past Surgical:  Past Surgical History:   Procedure Laterality Date    OTHER SURGICAL HISTORY Left 08/10/2016    cortisone injection-knee. [facility]    REVISION TOTAL HIP ARTHROPLASTY Left 11/2013    Dr. Prashant Calderon Right 12/16/2009    TOTAL HIP ARTHROPLASTY Left 10/28/2009    WISDOM TOOTH EXTRACTION           Family History:  Family History   Problem Relation Age of Onset    Stroke Father 76    Cancer Father         skin    Other Brother         sleep apnea    COPD Brother         tobacco abuse       Social History:  Social History     Socioeconomic History    Marital status:      Spouse name: Not on file    Number of children: Not on file    Years of education: Not on file    Highest education level: Not on file   Occupational History    Occupation:      Employer: [address]   Tobacco Use    Smoking status: Never    Smokeless tobacco: Never   Vaping Use    Vaping Use: Never used   Substance and Sexual Activity    Alcohol use:  Yes     Alcohol/week: 5.0 standard drinks     Types: 6 drink(s) per week     Comment: weekends    Drug use: No    Sexual activity: Not on file   Other Topics Concern    Not on file   Social History Narrative    Not on file     Social Determinants of Health     Financial Resource Strain: Not on file   Food Insecurity: Not on file   Transportation Needs: Not on file   Physical Activity: Not on file   Stress: Not on file   Social Connections: Not on file   Intimate Partner Violence: Not on file   Housing Stability: Not on file        REVIEW OF SYSTEMS:    Constitutional: there has been no unanticipated weight loss. There's been No change in energy level, No change in activity level.     Eyes: No visual changes or diplopia. No scleral icterus.  ENT: No Headaches, hearing loss or vertigo. No mouth sores or sore throat.  Cardiovascular: AS HPI  Respiratory: AS HPI  Gastrointestinal: No abdominal pain, appetite loss, blood in stools. No change in bowel or bladder habits.  Genitourinary: No dysuria, trouble voiding, or hematuria.  Musculoskeletal:  No gait disturbance, No weakness or joint complaints.  Integumentary: No rash or pruritis.  Neurological: No headache, diplopia, change in muscle strength, numbness or tingling. No change in gait, balance, coordination, mood, affect, memory, mentation, behavior.  Psychiatric: No new anxiety or depression.  Endocrine: No temperature intolerance. No excessive thirst, fluid intake, or urination. No tremor.  Hematologic/Lymphatic: No abnormal bruising or bleeding, blood clots or swollen lymph nodes.  Allergic/Immunologic: No nasal congestion or hives.    Medications:    Current Outpatient Medications:     tamsulosin (FLOMAX) 0.4 MG capsule, Take 1 capsule by mouth daily for 30 doses, Disp: 30 capsule, Rfl: 0    latanoprost (XALATAN) 0.005 % ophthalmic solution, INSTILL 1 DROP INTO EACH EYE IN THE EVENING, Disp: , Rfl:     ciprofloxacin (CIPRO) 500 MG tablet, Take 1 tablet by mouth 2 times daily, Disp: 14 tablet, Rfl: 0    ondansetron (ZOFRAN ODT) 4 MG disintegrating tablet, Take 1 tablet by mouth every 8 hours as needed for Nausea, Disp: 10 tablet, Rfl: 0    loratadine (CLARITIN) 10 MG tablet, Take 10 mg by mouth daily as needed, Disp: , Rfl:     naproxen sodium (ANAPROX) 220 MG tablet, Take 220 mg by mouth  2 times daily as needed for Pain, Disp: , Rfl:     Multiple Vitamins-Minerals (THERAPEUTIC MULTIVITAMIN-MINERALS) tablet, Take 1 tablet by mouth daily. , Disp: , Rfl:     aspirin 81 MG tablet, Take 81 mg by mouth daily. , Disp: , Rfl:     ferrous sulfate 325 (65 FE) MG tablet, Take 325 mg by mouth every other day., Disp: , Rfl:      Physical Exam:   Vitals: BP (!) 156/96   Pulse 95   Resp 19   Ht 6' (1.829 m)   Wt 288 lb (130.6 kg)   SpO2 95%   BMI 39.06 kg/m²   General appearance: alert and cooperative with exam  HEENT: Head: Normocephalic, no lesions, without obvious abnormality. Neck: no carotid bruit, no JVD  Lungs: clear to auscultation bilaterally  Heart:  regular rate and rhythm, S1, S2 normal, no Murmur  Abdomen: soft, non-tender; bowel sounds normal; no masses,  no organomegaly  Extremities: no site injection hematoma, extremities normal, atraumatic, no cyanosis.  no edema  Neurologic: Mental status: Alert, oriented, thought content appropriate    Labs:  Lab Results   Component Value Date    CHOL 240 (H) 03/11/2014    TRIG 216 (H) 03/11/2014    HDL 42 03/11/2014    LDLCHOLESTEROL 155 (H) 03/11/2014    VLDL 43 (H) 03/11/2014    CHOLHDLRATIO 6.0 (H) 03/11/2014       Lab Results   Component Value Date     11/04/2022    K 3.9 11/04/2022     11/04/2022    CO2 22 11/04/2022    BUN 21 11/04/2022    CREATININE 1.12 11/04/2022    GLUCOSE 169 (H) 11/04/2022    CALCIUM 10.2 11/04/2022    PROT 7.4 11/04/2022    LABALBU 4.5 11/04/2022    BILITOT 0.3 11/04/2022    ALKPHOS 69 11/04/2022    AST 23 11/04/2022    ALT 23 11/04/2022    LABGLOM >60 11/04/2022    GFRAA >60 02/21/2016    GLOB 2.9 02/21/2016       EKG:   Results for orders placed or performed during the hospital encounter of 01/20/23   EKG 12 Lead   Result Value Ref Range    Ventricular Rate 76 BPM    Atrial Rate 76 BPM    P-R Interval 170 ms    QRS Duration 114 ms    Q-T Interval 384 ms    QTc Calculation (Bazett) 432 ms    P Axis 71 degrees    R Axis -67 degrees    T Axis 62 degrees    Narrative    Normal sinus rhythm  Left anterior fascicular block  Abnormal ECG  No previous ECGs available       Past Medical and Surgical History, Problem List, Allergies, Medications, Labs, Imaging, all reviewed extensively in EMR and with the patient. Assessment:  - Preop risk for Urological procedure  - Abnormal ECG with LAFB  - ? HTN  - Overweight    Plan:  - will check 2d Echo to eval for structural heart disease  - if echo low risk, can proceed at moderate risk and follow up as needed. The patient is to continue heart healthy diet, weight loss and exercise as tolerated. Patient's medications and side effects were discussed. Medication refills were provided if needed. Follow up appointment timing was discussed. All questions and concerns were addressed to patient's satisfaction. Thank you for allowing me to participate in the care of this patient, please do not hesitate to call if you have any questions. Eri Santamaria DO, [facility], [address], [address], [address] [facility]  [facility]. [facility]  [phone]

## 2023-02-01 ENCOUNTER — TELEPHONE (OUTPATIENT)
Dept: CARDIOLOGY | Age: 63
End: 2023-02-01

## 2023-02-01 ENCOUNTER — HOSPITAL ENCOUNTER (OUTPATIENT)
Dept: NON INVASIVE DIAGNOSTICS | Age: 63
Discharge: HOME | End: 2023-02-01
Payer: COMMERCIAL

## 2023-02-01 DIAGNOSIS — Z01.810 PREOP CARDIOVASCULAR EXAM: ICD-10-CM

## 2023-02-01 DIAGNOSIS — R94.31 ABNORMAL ECG: ICD-10-CM

## 2023-02-01 LAB
LV EF: 55 %
LVEF MODALITY: NORMAL

## 2023-02-01 PROCEDURE — 93306 TTE W/DOPPLER COMPLETE: CPT

## 2023-02-01 NOTE — TELEPHONE ENCOUNTER
Patient notified of echo results per Dr Aster Liao and of clearance. Pt verbalized understanding and had no further questions at the time.

## 2023-02-01 NOTE — TELEPHONE ENCOUNTER
Cardiology Consultation  [facility] [address], [address] [place]  [phone]      02/01/23       Regarding:  Leander Mohs  1960      To Whom It May Concern,      Patient is Intermediate Cardiac Risk for planned surgery. Special instructions:  Patient is taking Aspirin and can be held for 5-7 days prior to procedure and resumed as soon as surgical bleeding risk has resolved. Please continue other meds.         Thank you,      Jenae Liao DO, FACC, RPVI, LUKAS, [facility]  Cardiology  [facility] [place]    Electronically signed by Jenae Liao DO Plan: OD first Vivity tgt fili, OS second Vivity tgt -1.00. +PMN.

## 2023-02-15 ENCOUNTER — ANESTHESIA (OUTPATIENT)
Dept: OPERATING ROOM | Age: 63
End: 2023-02-15
Payer: COMMERCIAL

## 2023-02-15 ENCOUNTER — ANESTHESIA EVENT (OUTPATIENT)
Dept: OPERATING ROOM | Age: 63
End: 2023-02-15
Payer: COMMERCIAL

## 2023-02-15 ENCOUNTER — HOSPITAL ENCOUNTER (OUTPATIENT)
Age: 63
Setting detail: OUTPATIENT SURGERY
Discharge: HOME | End: 2023-02-15
Attending: UROLOGY | Admitting: UROLOGY
Payer: COMMERCIAL

## 2023-02-15 ENCOUNTER — APPOINTMENT (OUTPATIENT)
Dept: GENERAL RADIOLOGY | Age: 63
End: 2023-02-15
Attending: UROLOGY
Payer: COMMERCIAL

## 2023-02-15 VITALS
RESPIRATION RATE: 16 BRPM | DIASTOLIC BLOOD PRESSURE: 89 MMHG | TEMPERATURE: 97 F | WEIGHT: 280 LBS | HEART RATE: 75 BPM | BODY MASS INDEX: 37.93 KG/M2 | SYSTOLIC BLOOD PRESSURE: 167 MMHG | OXYGEN SATURATION: 94 % | HEIGHT: 72 IN

## 2023-02-15 DIAGNOSIS — G89.18 POST-OP PAIN: Primary | ICD-10-CM

## 2023-02-15 PROCEDURE — 2500000003 HC RX 250 WO HCPCS: Performed by: NURSE ANESTHETIST, CERTIFIED REGISTERED

## 2023-02-15 PROCEDURE — 7100000011 HC PHASE II RECOVERY - ADDTL 15 MIN: Performed by: UROLOGY

## 2023-02-15 PROCEDURE — 6360000002 HC RX W HCPCS: Performed by: NURSE ANESTHETIST, CERTIFIED REGISTERED

## 2023-02-15 PROCEDURE — C2617 STENT, NON-COR, TEM W/O DEL: HCPCS | Performed by: UROLOGY

## 2023-02-15 PROCEDURE — C1769 GUIDE WIRE: HCPCS | Performed by: UROLOGY

## 2023-02-15 PROCEDURE — 3600000014 HC SURGERY LEVEL 4 ADDTL 15MIN: Performed by: UROLOGY

## 2023-02-15 PROCEDURE — 6360000002 HC RX W HCPCS: Performed by: UROLOGY

## 2023-02-15 PROCEDURE — 7100000000 HC PACU RECOVERY - FIRST 15 MIN: Performed by: UROLOGY

## 2023-02-15 PROCEDURE — 2709999900 HC NON-CHARGEABLE SUPPLY: Performed by: UROLOGY

## 2023-02-15 PROCEDURE — 7100000010 HC PHASE II RECOVERY - FIRST 15 MIN: Performed by: UROLOGY

## 2023-02-15 PROCEDURE — 3600000004 HC SURGERY LEVEL 4 BASE: Performed by: UROLOGY

## 2023-02-15 PROCEDURE — 3209999900 FLUORO FOR SURGICAL PROCEDURES

## 2023-02-15 PROCEDURE — 2580000003 HC RX 258: Performed by: UROLOGY

## 2023-02-15 PROCEDURE — 3700000001 HC ADD 15 MINUTES (ANESTHESIA): Performed by: UROLOGY

## 2023-02-15 PROCEDURE — 3700000000 HC ANESTHESIA ATTENDED CARE: Performed by: UROLOGY

## 2023-02-15 PROCEDURE — C1894 INTRO/SHEATH, NON-LASER: HCPCS | Performed by: UROLOGY

## 2023-02-15 DEVICE — URETERAL STENT
Type: IMPLANTABLE DEVICE | Site: URETER | Status: FUNCTIONAL
Brand: CONTOUR™

## 2023-02-15 RX ORDER — SODIUM CHLORIDE 0.9 % (FLUSH) 0.9 %
5-40 SYRINGE (ML) INJECTION PRN
Status: DISCONTINUED | OUTPATIENT
Start: 2023-02-15 | End: 2023-02-15 | Stop reason: HOSPADM

## 2023-02-15 RX ORDER — KETOROLAC TROMETHAMINE 30 MG/ML
INJECTION, SOLUTION INTRAMUSCULAR; INTRAVENOUS PRN
Status: DISCONTINUED | OUTPATIENT
Start: 2023-02-15 | End: 2023-02-15 | Stop reason: SDUPTHER

## 2023-02-15 RX ORDER — FENTANYL CITRATE 50 UG/ML
INJECTION, SOLUTION INTRAMUSCULAR; INTRAVENOUS PRN
Status: DISCONTINUED | OUTPATIENT
Start: 2023-02-15 | End: 2023-02-15 | Stop reason: SDUPTHER

## 2023-02-15 RX ORDER — HYDROCODONE BITARTRATE AND ACETAMINOPHEN 5; 325 MG/1; MG/1
1 TABLET ORAL EVERY 6 HOURS PRN
Status: DISCONTINUED | OUTPATIENT
Start: 2023-02-15 | End: 2023-02-15 | Stop reason: HOSPADM

## 2023-02-15 RX ORDER — PROPOFOL 10 MG/ML
INJECTION, EMULSION INTRAVENOUS PRN
Status: DISCONTINUED | OUTPATIENT
Start: 2023-02-15 | End: 2023-02-15 | Stop reason: SDUPTHER

## 2023-02-15 RX ORDER — FENTANYL CITRATE 50 UG/ML
50 INJECTION, SOLUTION INTRAMUSCULAR; INTRAVENOUS EVERY 5 MIN PRN
Status: DISCONTINUED | OUTPATIENT
Start: 2023-02-15 | End: 2023-02-15 | Stop reason: HOSPADM

## 2023-02-15 RX ORDER — SODIUM CHLORIDE 9 MG/ML
INJECTION, SOLUTION INTRAVENOUS PRN
Status: DISCONTINUED | OUTPATIENT
Start: 2023-02-15 | End: 2023-02-15 | Stop reason: HOSPADM

## 2023-02-15 RX ORDER — CIPROFLOXACIN 2 MG/ML
400 INJECTION, SOLUTION INTRAVENOUS ONCE
Status: COMPLETED | OUTPATIENT
Start: 2023-02-15 | End: 2023-02-15

## 2023-02-15 RX ORDER — OXYBUTYNIN CHLORIDE 10 MG/1
10 TABLET, EXTENDED RELEASE ORAL DAILY
Qty: 10 TABLET | Refills: 0 | Status: SHIPPED | OUTPATIENT
Start: 2023-02-15 | End: 2023-02-25

## 2023-02-15 RX ORDER — SODIUM CHLORIDE, SODIUM LACTATE, POTASSIUM CHLORIDE, CALCIUM CHLORIDE 600; 310; 30; 20 MG/100ML; MG/100ML; MG/100ML; MG/100ML
INJECTION, SOLUTION INTRAVENOUS CONTINUOUS
Status: DISCONTINUED | OUTPATIENT
Start: 2023-02-15 | End: 2023-02-15 | Stop reason: HOSPADM

## 2023-02-15 RX ORDER — PHENAZOPYRIDINE HYDROCHLORIDE 200 MG/1
200 TABLET, FILM COATED ORAL 3 TIMES DAILY PRN
Qty: 15 TABLET | Refills: 0 | Status: SHIPPED | OUTPATIENT
Start: 2023-02-15 | End: 2023-02-20

## 2023-02-15 RX ORDER — TAMSULOSIN HYDROCHLORIDE 0.4 MG/1
0.4 CAPSULE ORAL DAILY
Qty: 90 CAPSULE | Refills: 0 | Status: SHIPPED | OUTPATIENT
Start: 2023-02-15 | End: 2023-03-17

## 2023-02-15 RX ORDER — SODIUM CHLORIDE 0.9 % (FLUSH) 0.9 %
5-40 SYRINGE (ML) INJECTION EVERY 12 HOURS SCHEDULED
Status: DISCONTINUED | OUTPATIENT
Start: 2023-02-15 | End: 2023-02-15 | Stop reason: HOSPADM

## 2023-02-15 RX ORDER — TAMSULOSIN HYDROCHLORIDE 0.4 MG/1
0.4 CAPSULE ORAL DAILY
Qty: 30 CAPSULE | Refills: 0 | Status: SHIPPED | OUTPATIENT
Start: 2023-02-15 | End: 2023-03-17

## 2023-02-15 RX ORDER — LIDOCAINE HYDROCHLORIDE 20 MG/ML
INJECTION, SOLUTION EPIDURAL; INFILTRATION; INTRACAUDAL; PERINEURAL PRN
Status: DISCONTINUED | OUTPATIENT
Start: 2023-02-15 | End: 2023-02-15 | Stop reason: SDUPTHER

## 2023-02-15 RX ORDER — ONDANSETRON 2 MG/ML
INJECTION INTRAMUSCULAR; INTRAVENOUS PRN
Status: DISCONTINUED | OUTPATIENT
Start: 2023-02-15 | End: 2023-02-15 | Stop reason: SDUPTHER

## 2023-02-15 RX ORDER — ACETAMINOPHEN 325 MG/1
650 TABLET ORAL
Status: DISCONTINUED | OUTPATIENT
Start: 2023-02-15 | End: 2023-02-15 | Stop reason: HOSPADM

## 2023-02-15 RX ORDER — OXYCODONE HYDROCHLORIDE AND ACETAMINOPHEN 5; 325 MG/1; MG/1
1 TABLET ORAL EVERY 4 HOURS PRN
Qty: 20 TABLET | Refills: 0 | Status: SHIPPED | OUTPATIENT
Start: 2023-02-15 | End: 2023-02-18

## 2023-02-15 RX ORDER — DEXAMETHASONE SODIUM PHOSPHATE 4 MG/ML
INJECTION, SOLUTION INTRA-ARTICULAR; INTRALESIONAL; INTRAMUSCULAR; INTRAVENOUS; SOFT TISSUE PRN
Status: DISCONTINUED | OUTPATIENT
Start: 2023-02-15 | End: 2023-02-15 | Stop reason: SDUPTHER

## 2023-02-15 RX ADMIN — KETOROLAC TROMETHAMINE 30 MG: 30 INJECTION, SOLUTION INTRAMUSCULAR at 13:09

## 2023-02-15 RX ADMIN — LIDOCAINE HYDROCHLORIDE 100 MG: 20 INJECTION, SOLUTION EPIDURAL; INFILTRATION; INTRACAUDAL; PERINEURAL at 12:29

## 2023-02-15 RX ADMIN — FENTANYL CITRATE 100 MCG: 50 INJECTION, SOLUTION INTRAMUSCULAR; INTRAVENOUS at 12:42

## 2023-02-15 RX ADMIN — SODIUM CHLORIDE, POTASSIUM CHLORIDE, SODIUM LACTATE AND CALCIUM CHLORIDE: 600; 310; 30; 20 INJECTION, SOLUTION INTRAVENOUS at 12:14

## 2023-02-15 RX ADMIN — PROPOFOL 200 MG: 10 INJECTION, EMULSION INTRAVENOUS at 12:29

## 2023-02-15 RX ADMIN — FENTANYL CITRATE 50 MCG: 50 INJECTION, SOLUTION INTRAMUSCULAR; INTRAVENOUS at 12:44

## 2023-02-15 RX ADMIN — ONDANSETRON 4 MG: 2 INJECTION INTRAMUSCULAR; INTRAVENOUS at 12:39

## 2023-02-15 RX ADMIN — CIPROFLOXACIN 400 MG: 2 INJECTION, SOLUTION INTRAVENOUS at 12:24

## 2023-02-15 RX ADMIN — FENTANYL CITRATE 50 MCG: 50 INJECTION, SOLUTION INTRAMUSCULAR; INTRAVENOUS at 12:29

## 2023-02-15 RX ADMIN — DEXAMETHASONE SODIUM PHOSPHATE 4 MG: 4 INJECTION, SOLUTION INTRAMUSCULAR; INTRAVENOUS at 12:39

## 2023-02-15 ASSESSMENT — PAIN SCALES - GENERAL
PAINLEVEL_OUTOF10: 0
PAINLEVEL_OUTOF10: 2

## 2023-02-15 ASSESSMENT — PAIN DESCRIPTION - LOCATION: LOCATION: FLANK

## 2023-02-15 ASSESSMENT — PAIN DESCRIPTION - PAIN TYPE: TYPE: SURGICAL PAIN

## 2023-02-15 ASSESSMENT — PAIN - FUNCTIONAL ASSESSMENT: PAIN_FUNCTIONAL_ASSESSMENT: 0-10

## 2023-02-15 ASSESSMENT — PAIN DESCRIPTION - ORIENTATION: ORIENTATION: RIGHT

## 2023-02-15 ASSESSMENT — PAIN DESCRIPTION - DESCRIPTORS: DESCRIPTORS: DISCOMFORT

## 2023-02-15 NOTE — H&P
History and Physical    Patient:  Jada Restrepo  MRN: [id number]  YOB: 1960    CHIEF COMPLAINT:  right kidney stone    HISTORY OF PRESENT ILLNESS:   The patient is a 58 y.o. male who presents with as above, here for surgery    Patient's old records, notes and chart reviewed and summarized above. Past Medical History:    Past Medical History:   Diagnosis Date    Anemia     Avascular necrosis ([address]     bilateral hips. S/P FAVIAN b/L    Kidney stone     Mixed hyperlipidemia     Obesity     Osteoarthritis     hips, knees       Past Surgical History:    Past Surgical History:   Procedure Laterality Date    OTHER SURGICAL HISTORY Left 08/10/2016    cortisone injection-knee. [facility]    REVISION TOTAL HIP ARTHROPLASTY Left 11/2013    Dr. Osei Gibson Right 12/16/2009    TOTAL HIP ARTHROPLASTY Left 10/28/2009    WISDOM TOOTH EXTRACTION       Medications Prior to Admission:    Prior to Admission medications    Medication Sig Start Date End Date Taking? Authorizing Provider   tamsulosin (FLOMAX) 0.4 MG capsule Take 1 capsule by mouth daily for 30 doses 1/12/23 2/11/23  Trent Graves MD   latanoprost (XALATAN) 0.005 % ophthalmic solution INSTILL 1 DROP INTO EACH EYE IN THE EVENING 10/5/22   Historical Provider, MD   ondansetron (ZOFRAN ODT) 4 MG disintegrating tablet Take 1 tablet by mouth every 8 hours as needed for Nausea 11/4/22   Kendell Gabriel DO   loratadine (CLARITIN) 10 MG tablet Take 10 mg by mouth daily as needed    Historical Provider, MD   naproxen sodium (ANAPROX) 220 MG tablet Take 220 mg by mouth 2 times daily as needed for Pain    Historical Provider, MD   Multiple Vitamins-Minerals (THERAPEUTIC MULTIVITAMIN-MINERALS) tablet Take 1 tablet by mouth daily. Historical Provider, MD   aspirin 81 MG tablet Take 81 mg by mouth daily. Historical Provider, MD   ferrous sulfate 325 (65 FE) MG tablet Take 325 mg by mouth every other day.     Historical Provider, MD       Allergies:  Pcn [penicillins]    Social History:    Social History     Socioeconomic History    Marital status:      Spouse name: Not on file    Number of children: Not on file    Years of education: Not on file    Highest education level: Not on file   Occupational History    Occupation:      Employer: [address]   Tobacco Use    Smoking status: Never    Smokeless tobacco: Never   Vaping Use    Vaping Use: Never used   Substance and Sexual Activity    Alcohol use: Yes     Alcohol/week: 5.0 standard drinks     Types: 6 drink(s) per week     Comment: weekends    Drug use: No    Sexual activity: Not on file   Other Topics Concern    Not on file   Social History Narrative    Not on file     Social Determinants of Health     Financial Resource Strain: Not on file   Food Insecurity: Not on file   Transportation Needs: Not on file   Physical Activity: Not on file   Stress: Not on file   Social Connections: Not on file   Intimate Partner Violence: Not on file   Housing Stability: Not on file       Family History:    Family History   Problem Relation Age of Onset    Stroke Father 76    Cancer Father         skin    Other Brother         sleep apnea    COPD Brother         tobacco abuse       REVIEW OF SYSTEMS:  Constitutional: negative  Eyes: negative  Respiratory: negative  Cardiovascular: negative  Gastrointestinal: negative  Genitourinary: see HPI  Musculoskeletal: negative  Skin: negative   Neurological: negative  Hematological/Lymphatic: negative  Psychological: negative    Physical Exam:      No data found. Constitutional: Patient in no acute distress; Neuro: alert and oriented to person place and time. Psych: Mood and affect normal.  Skin: Normal  Lungs: Respiratory effort normal, CTA  Cardiovascular:  Normal peripheral pulses; no murmur  Abdomen: Soft, non-tender, non-distended with no CVA, flank pain, hepatosplenomegaly or hernia.   Kidneys normal.  Bladder non-tender and not distended. LABS:   No results for input(s): WBC, HGB, HCT, MCV, PLT in the last 72 hours. No results for input(s): NA, K, CL, CO2, PHOS, BUN, CREATININE, CA in the last 72 hours. Lab Results   Component Value Date    PSA 2.64 03/11/2014           Urinalysis: No results for input(s): COLORU, PHUR, LABCAST, WBCUA, RBCUA, MUCUS, TRICHOMONAS, YEAST, BACTERIA, CLARITYU, SPECGRAV, LEUKOCYTESUR, UROBILINOGEN, Alonzo Booty in the last 72 hours. Invalid input(s): NITRATE, GLUCOSEUKETONESUAMORPHOUS     -----------------------------------------------------------------      Assessment and Plan   Impression:    Patient Active Problem List   Diagnosis    Avascular necrosis ([address]    Obesity    Anemia    Mixed hyperlipidemia    Impaired glucose tolerance test       Plan:   Risks: I discussed all the risks, benefits, alternatives and possible complications or surgery as well as expectations and post-op recovery.       Consent obtained; cystoscopy right ureteroscopy HLL and stent placement in OR today    Kristan Glover M.D, MD  8:00 AM 2/15/2023

## 2023-02-15 NOTE — ANESTHESIA PRE PROCEDURE
Department of Anesthesiology  Preprocedure Note       Name:  Anders Santa   Age:  58 y.o.  :  1960                                          MRN:  [id number]         Date:  2/15/2023      Surgeon: Iraj Taylor):  Garry Jenkins MD    Procedure: Procedure(s):  Cystoscopy Right Ureteroscopy Holmium Laser Right Stent Exchange Maddie Campos #[id number] [id number])    Medications prior to admission:   Prior to Admission medications    Medication Sig Start Date End Date Taking? Authorizing Provider   tamsulosin (FLOMAX) 0.4 MG capsule Take 1 capsule by mouth daily for 30 doses 23  Trent Graves MD   latanoprost (XALATAN) 0.005 % ophthalmic solution INSTILL 1 DROP INTO EACH EYE IN THE EVENING 10/5/22   Historical Provider, MD   ondansetron (ZOFRAN ODT) 4 MG disintegrating tablet Take 1 tablet by mouth every 8 hours as needed for Nausea 22   Jamila Smith DO   loratadine (CLARITIN) 10 MG tablet Take 10 mg by mouth daily as needed  Patient not taking: Reported on 2/15/2023    Historical Provider, MD   naproxen sodium (ANAPROX) 220 MG tablet Take 220 mg by mouth 2 times daily as needed for Pain  Patient not taking: Reported on 2/15/2023    Historical Provider, MD   Multiple Vitamins-Minerals (THERAPEUTIC MULTIVITAMIN-MINERALS) tablet Take 1 tablet by mouth daily. Historical Provider, MD   aspirin 81 MG tablet Take 81 mg by mouth daily. Historical Provider, MD   ferrous sulfate 325 (65 FE) MG tablet Take 325 mg by mouth every other day.     Historical Provider, MD       Current medications:    Current Facility-Administered Medications   Medication Dose Route Frequency Provider Last Rate Last Admin    sodium chloride flush 0.9 % injection 5-40 mL  5-40 mL IntraVENous 2 times per day Garry Jenkins MD        sodium chloride flush 0.9 % injection 5-40 mL  5-40 mL IntraVENous PRN Trent Graves MD        0.9 % sodium chloride infusion   IntraVENous PRN Garry Jenkins MD        lactated ringers IV soln infusion   IntraVENous Continuous Yuan Wu  mL/hr at 02/15/23 1214 New Bag at 02/15/23 1214    ciprofloxacin (CIPRO) IVPB 400 mg  400 mg IntraVENous Once Yuan Wu MD           Allergies: Allergies   Allergen Reactions    Pcn [Penicillins] Other (See Comments)     Unknown rx       Problem List:    Patient Active Problem List   Diagnosis Code    Avascular necrosis ([address] M87.00    Obesity E66.9    Anemia D64.9    Mixed hyperlipidemia E78.2    Impaired glucose tolerance test R73.09       Past Medical History:        Diagnosis Date    Anemia     Avascular necrosis ([address]     bilateral hips. S/P FAVIAN b/L    Kidney stone     Mixed hyperlipidemia     Obesity     Osteoarthritis     hips, knees       Past Surgical History:        Procedure Laterality Date    OTHER SURGICAL HISTORY Left 08/10/2016    cortisone injection-knee. [facility]    REVISION TOTAL HIP ARTHROPLASTY Left 11/2013    Dr. Marshall Andino Right 12/16/2009    TOTAL HIP ARTHROPLASTY Left 10/28/2009    WISDOM TOOTH EXTRACTION         Social History:    Social History     Tobacco Use    Smoking status: Never    Smokeless tobacco: Never   Substance Use Topics    Alcohol use:  Yes     Alcohol/week: 5.0 standard drinks     Types: 6 drink(s) per week     Comment: weekends                                Counseling given: Not Answered      Vital Signs (Current):   Vitals:    02/15/23 1153   BP: (!) 144/87   Pulse: 84   Resp: 16   Temp: 36.7 °C (98 °F)   SpO2: 98%   Weight: 280 lb (127 kg)   Height: 6' (1.829 m)                                              BP Readings from Last 3 Encounters:   02/15/23 (!) 144/87   01/30/23 (!) 156/96   01/18/23 136/80       NPO Status: Time of last liquid consumption: 2100                        Time of last solid consumption: 2100                        Date of last liquid consumption: 02/14/23                        Date of last solid food consumption: 02/14/23    BMI:   Wt Readings from Last 3 Encounters:   02/15/23 280 lb (127 kg)   01/30/23 288 lb (130.6 kg)   01/18/23 279 lb (126.6 kg)     Body mass index is 37.97 kg/m². CBC:   Lab Results   Component Value Date/Time    WBC 10.9 11/04/2022 01:16 AM    RBC 4.17 11/04/2022 01:16 AM    HGB 12.6 11/04/2022 01:16 AM    HCT 38.4 11/04/2022 01:16 AM    MCV 92.1 11/04/2022 01:16 AM    RDW 14.1 11/04/2022 01:16 AM     11/04/2022 01:16 AM       CMP:   Lab Results   Component Value Date/Time     11/04/2022 01:16 AM    K 3.9 11/04/2022 01:16 AM     11/04/2022 01:16 AM    CO2 22 11/04/2022 01:16 AM    BUN 21 11/04/2022 01:16 AM    CREATININE 1.12 11/04/2022 01:16 AM    GFRAA >60 02/21/2016 08:35 PM    LABGLOM >60 11/04/2022 01:16 AM    GLUCOSE 169 11/04/2022 01:16 AM    PROT 7.4 11/04/2022 01:16 AM    CALCIUM 10.2 11/04/2022 01:16 AM    BILITOT 0.3 11/04/2022 01:16 AM    ALKPHOS 69 11/04/2022 01:16 AM    AST 23 11/04/2022 01:16 AM    ALT 23 11/04/2022 01:16 AM       POC Tests: No results for input(s): POCGLU, POCNA, POCK, POCCL, POCBUN, POCHEMO, POCHCT in the last 72 hours. Coags: No results found for: PROTIME, INR, APTT    HCG (If Applicable): No results found for: PREGTESTUR, PREGSERUM, HCG, HCGQUANT     ABGs: No results found for: PHART, PO2ART, XJC2XPI, ORG0DMK, BEART, A8IVROSW     Type & Screen (If Applicable):  No results found for: LABABO, LABRH    Drug/Infectious Status (If Applicable):  No results found for: HIV, HEPCAB    COVID-19 Screening (If Applicable):   Lab Results   Component Value Date/Time    COVID19 Detected 01/17/2021 04:10 PM           Anesthesia Evaluation  Patient summary reviewed and Nursing notes reviewed no history of anesthetic complications:   Airway: Mallampati: III  TM distance: >3 FB   Neck ROM: full  Mouth opening: > = 3 FB   Dental:    (+) partials  Comment: Upper and lower partials.     Pulmonary:normal exam  breath sounds clear to auscultation  (+) sleep apnea: on noncompliant,                             Cardiovascular:  Exercise tolerance: good (>4 METS),         ECG reviewed  Rhythm: regular  Rate: normal  Echocardiogram reviewed         Beta Blocker:  Not on Beta Blocker         Neuro/Psych:   Negative Neuro/Psych ROS              GI/Hepatic/Renal:   (+) renal disease: kidney stones,           Endo/Other:    (+) : arthritis:., .                 Abdominal:   (+) obese,           Vascular: negative vascular ROS. Other Findings:           Anesthesia Plan      general     ASA 3       Induction: intravenous. MIPS: Postoperative opioids intended and Prophylactic antiemetics administered. Anesthetic plan and risks discussed with patient. Plan discussed with CRNA.                     CHANDAN Dumont - JUANA   2/15/2023

## 2023-02-15 NOTE — PROGRESS NOTES
CLINICAL PHARMACY NOTE: MEDS TO BEDS    Total # of Prescriptions Filled: 4   The following medications were delivered to the patient:  Phenazopyridine 200mg  Percocet 5/325mg  Ditropan XL 10mg  Flomax 0.4mg    Additional Documentation:

## 2023-02-15 NOTE — DISCHARGE INSTRUCTIONS
Ureteral Stent Information  -Ureteral stents are hollow tubes with multiple side holes that coils in your kidney and proceeds down your ureter where it then coils in your bladder                              -Most stents are temporary, if you have a chronic  stent it will need to be exchanged regularly. If left in longer than recommended, serious   complications of severe encrustation, UTI,   and/or obstruction and potential loss of kidney can occur    -Ureteral stents are used to relieve ureteral obstruction and promote ureteral healing following surgery    Why you may have a Stent:  -Ureteral obstruction secondary to kidney stones, ureteral stenosis, ureteral anastomosis, or preventative (prior to ESWL for large stone)    Stent Symptoms  You may not have any symptoms at all   Irritating voiding symptoms; urgency, frequency, feeling of incomplete bladder emptying, burning, blood in urine for up to 1-3 days, straining (all likely due to stent irritating the bladder)  Suprapubic pressure/discomfort  Flank pain    Stent Management  -You will likely be discharged home with:  Pain medication- take as needed for severe pain  Anticholinergic (Oxybutynin, Urispas)- These medications will decrease ureteral and bladder spasms that are likely causing discomfort  Flomax-relaxes ureter  Antibiotic-treats or prevents infection-take medication until completed  *Take all medications as prescribed    *If pain is severe take pain pill, take a hot shower for 10-15 minutes, and then apply heating pad to affected area      -Diet  Normal diet,         Increase water intake!!!! Try to consume at least 2 liters of water a day  AVOID Caffeine-this can make symptoms worse!  -Activity  Avoid strenuous activity!!   Rest when tired  Do not drive if taking narcotic pain medicine  *Call provider if developing symptoms of infection; fever, chills, pus in your urine, new onset body aches    Follow-up is Key part of treatment and safety!!! Cystoscopy stent placement with string: You may see blood in the urine after the procedure. This should resolve over the next couple days. Please stay hydrated. You may see intermittent blood in the urine while the stent is in place. This is expected. You may experience flank pain, and/or frequency/urgency of urination while the stent is in place. Pt ok to discharge home in good condition  No heavy lifting, >10 lbs for today  Pt should avoid strenuous activity for today  Pt should walk moderately at home  Pt ok to shower   Pt may resume diet as tolerated  Pt should take Rx as directed  No driving while on narcotics  Please call attending physician or hospital  with questions  Call or Present to ED if fever (> 101F), intractable nausea vomiting or pain. Rx in chart    Pt should follow up with Dr. Michael Evans, in 6-8 weeks, call to confirm appointment    Pt should Pull stent in 7 days. There may be some pain associated with the stent removal, which is usually self limiting. We suggest using the pain medication prescribed for you and a nonsteroidal anti-inflammatory such as Ibuprofen, if you are able to take this medication, to control symptoms. Take Ibuprofen as directed for 24 hrs after stent pull. Please stay hydrated. Please call with questions.

## 2023-02-16 NOTE — ANESTHESIA POSTPROCEDURE EVALUATION
Department of Anesthesiology  Postprocedure Note    Patient: Ngoziiline Stage  MRN: [id number]  YOB: 1960  Date of evaluation: 2/16/2023      Procedure Summary     Date: 02/15/23 Room / Location: [address] [place] [address]    Anesthesia Start: 4601 Anesthesia Stop: 9899    Procedure: Cystoscopy Right Ureteroscopy Holmium Laser Right Stent Exchange (Right) Diagnosis:       Right ureteral stone      (Right ureteral stone [N20.1])    Surgeons: Rosalind Rich MD Responsible Provider: CHANDAN Blair CRNA    Anesthesia Type: general ASA Status: 3          Anesthesia Type: No value filed.     El Phase I: El Score: 8    El Phase II: El Score: 10      Anesthesia Post Evaluation    Patient location during evaluation: bedside  Patient participation: complete - patient participated  Level of consciousness: awake and alert  Pain score: 0  Airway patency: patent  Nausea & Vomiting: no nausea and no vomiting  Complications: no  Cardiovascular status: hemodynamically stable  Respiratory status: acceptable  Hydration status: stable

## 2023-02-17 NOTE — OP NOTE
Operative Note      Patient: Lynette Neri  YOB: 1960  MRN: [id number]    Date of Procedure: 2/15/2023    Pre-Op Diagnosis: Right Kidney stone    Post-Op Diagnosis: Same       PROCEDURES PERFORMED:  1. Cystoscopy. 2. Right sided ureteroscopy with holmium laser lithotripsy  3. Right sided stent placement. DRAINS: A Right sided 6x26 double J ureteral stent ( with string)    Surgeon(s):  Jen Nielsen MD    Assistant:   * No surgical staff found *    Anesthesia: General    Estimated Blood Loss (mL): Minimal    Complications: None    Specimens:   * No specimens in log *    Implants:  Implant Name Type Inv. Item Serial No.  Lot No. LRB No. Used Action   STENT URET 6FR L28CM PERCFLX HYDR+ PGTL TAPR TIP W/ ATTCH - [id number]  STENT URET 6FR L28CM PERCFLX HYDR+ PGTL TAPR TIP W/ ATTCH  [employer] [facility] UROLOGY-WD [id number] Right 1 Implanted         Drains: * No LDAs found *    Findings: right kidney stone    Detailed Description of Procedure:     INDICATIONS FOR PROCEDURE:  Lynette Neri is a 58 y.o. male presents for Right sided calculus. After the risks, benefits, alternatives, of the procedure were discussed with the patient, informed consent was obtained. The patient elected to proceed. DETAILS OF THE PROCEDURE:  The patient was brought back from the preoperative holding area to the  operating suite, and was transferred to the operating table where the patient lay in  supine position. EPC's were in place, connected to the machine and the machine was turned on before induction. General endotracheal anesthesia was induced, and patient was prepped and draped in standard surgical fashion after being placed in dorsolithotomy position. A proper timeout was performed, preoperative antibiotics were given. We began by inserting a cystoscope with a 22 Syrian sheath and 30 degree lens into the patient's urethral meatus and advancing into the bladder without complication.   A pan cystoscopy was preformed and the bladder appeared unremarkable. We then focused our attention on the Right ureteral orifice, which we cannulated with our glidewire, advanced up to renal pelvis. We then used a dual lumen catheter to place a second wire . Once in good position, we advanced our flexible ureteroscope over the working wire to the renal pelvis under direct visualization. We identified the renal calculus and using a 200 micron holmium laser fiber we fragmented the calculus. It was dusted and the fragments appeared to be under 1 millimeter and could pass easily. At this time a complete pyeloscopy was preformed and no other substantial fragments were identified. We withdrew the ureteroscope and visualized the entire ureter. No stone fragments were identified. No damage to the ureter was identified. At this time, over the remaining glidewire, we placed a 6x26 double J ureteral stent in the usual fashion, and we noted appropriate placement in the upper collecting system using fluoroscopy. There was a good curl noted in the bladder. We decided to leave a string at the end of the stent, which was attached with benzoin and steri-strips. The patient's bladder was drained and removed the scope and the procedure was subsequently terminated.     Plan:  Discharge home in good condition  The patient can pull the stent in 7 days     Follow up 6-8 weeks with renal US    Electronically signed by Orly Meadows M.D, MD on 2/17/2023 at 1:01 PM

## 2023-03-28 DIAGNOSIS — N20.0 NEPHROLITHIASIS: Primary | ICD-10-CM

## 2023-04-17 ENCOUNTER — HOSPITAL ENCOUNTER (OUTPATIENT)
Dept: ULTRASOUND IMAGING | Age: 63
Discharge: HOME | End: 2023-04-19
Payer: COMMERCIAL

## 2023-04-17 DIAGNOSIS — N20.0 NEPHROLITHIASIS: ICD-10-CM

## 2023-04-17 PROCEDURE — 76770 US EXAM ABDO BACK WALL COMP: CPT
